# Patient Record
Sex: MALE | Race: WHITE | NOT HISPANIC OR LATINO | URBAN - METROPOLITAN AREA
[De-identification: names, ages, dates, MRNs, and addresses within clinical notes are randomized per-mention and may not be internally consistent; named-entity substitution may affect disease eponyms.]

---

## 2023-03-14 DIAGNOSIS — M54.16 LUMBAR RADICULOPATHY: Primary | ICD-10-CM

## 2023-03-17 ENCOUNTER — TELEPHONE (OUTPATIENT)
Dept: NEUROSURGERY | Facility: CLINIC | Age: 38
End: 2023-03-17
Payer: COMMERCIAL

## 2023-03-17 NOTE — TELEPHONE ENCOUNTER
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  He has LBP and pain into left hip per note.  The pain is rated at 4/10 in the note.  There is a HNP left L4-5.  It does not document strength.  Would you want to pick a date to see him or next available, but put on wait list?

## 2023-03-17 NOTE — TELEPHONE ENCOUNTER
"----- Message from Nadege Caruso MA sent at 3/17/2023 12:18 PM CDT -----  Regarding: REFERRAL PROCESS- lumbar  This patient is being referred to Dr. Mehta by Shana Apodaca for lumbar radiculopathy. Reviewing MRI report, " L3-4 diffuse bulge is most prominent centrally and towards the right with underlying annular tear flattening ventral thecal sac with AP dimension measuring about 8-9mm in the midline." Please review imaging and advise on scheduling. Thank you    "

## 2023-03-20 NOTE — TELEPHONE ENCOUNTER
Spoke with patient. Scheduled him with Dr. Mehta for 3/30/23 at 1:30. Patient has not had any recent testing since MRI in 3/2023. Denies seeing any other doctors for this. Patient has had back sx with Dr. Romero and has not had any conservative treatment for his back. I did advise him that I will need OP notes prior to apt so I will e-mail him the release form and he will e-mail it back.

## 2023-03-20 NOTE — TELEPHONE ENCOUNTER
Tried to call patient to see if he can come in and see Dr. Mehta for 3/30/23 at 1:30, no answer and was unable to leave a vm.

## 2023-05-19 ENCOUNTER — TELEPHONE (OUTPATIENT)
Dept: NEUROSURGERY | Facility: CLINIC | Age: 38
End: 2023-05-19

## 2023-05-19 NOTE — TELEPHONE ENCOUNTER
Patient called to inquire about his apt today. He states that when he got to work this morning, he found out that someone else was leaving around lunch time and he really does not want to be pressed for time for this apt so he requested to r/s. I r/s his apt to 7/11/23 at 1:00. I did put him on the wait list per his request.

## 2023-07-11 ENCOUNTER — OFFICE VISIT (OUTPATIENT)
Dept: NEUROSURGERY | Facility: CLINIC | Age: 38
End: 2023-07-11
Payer: COMMERCIAL

## 2023-07-11 VITALS
RESPIRATION RATE: 16 BRPM | BODY MASS INDEX: 25.06 KG/M2 | WEIGHT: 185 LBS | HEART RATE: 75 BPM | HEIGHT: 72 IN | SYSTOLIC BLOOD PRESSURE: 116 MMHG | DIASTOLIC BLOOD PRESSURE: 73 MMHG

## 2023-07-11 DIAGNOSIS — M54.41 CHRONIC BILATERAL LOW BACK PAIN WITH BILATERAL SCIATICA: ICD-10-CM

## 2023-07-11 DIAGNOSIS — M51.16 LUMBAR DISC DISEASE WITH RADICULOPATHY: Primary | ICD-10-CM

## 2023-07-11 DIAGNOSIS — G89.29 CHRONIC BILATERAL LOW BACK PAIN WITH BILATERAL SCIATICA: ICD-10-CM

## 2023-07-11 DIAGNOSIS — M54.42 CHRONIC BILATERAL LOW BACK PAIN WITH BILATERAL SCIATICA: ICD-10-CM

## 2023-07-11 PROCEDURE — 3074F PR MOST RECENT SYSTOLIC BLOOD PRESSURE < 130 MM HG: ICD-10-PCS | Mod: CPTII,,, | Performed by: NEUROLOGICAL SURGERY

## 2023-07-11 PROCEDURE — 99204 OFFICE O/P NEW MOD 45 MIN: CPT | Mod: ,,, | Performed by: NEUROLOGICAL SURGERY

## 2023-07-11 PROCEDURE — 3078F PR MOST RECENT DIASTOLIC BLOOD PRESSURE < 80 MM HG: ICD-10-PCS | Mod: CPTII,,, | Performed by: NEUROLOGICAL SURGERY

## 2023-07-11 PROCEDURE — 3008F BODY MASS INDEX DOCD: CPT | Mod: CPTII,,, | Performed by: NEUROLOGICAL SURGERY

## 2023-07-11 PROCEDURE — 1159F PR MEDICATION LIST DOCUMENTED IN MEDICAL RECORD: ICD-10-PCS | Mod: CPTII,,, | Performed by: NEUROLOGICAL SURGERY

## 2023-07-11 PROCEDURE — 1159F MED LIST DOCD IN RCRD: CPT | Mod: CPTII,,, | Performed by: NEUROLOGICAL SURGERY

## 2023-07-11 PROCEDURE — 1160F RVW MEDS BY RX/DR IN RCRD: CPT | Mod: CPTII,,, | Performed by: NEUROLOGICAL SURGERY

## 2023-07-11 PROCEDURE — 99204 PR OFFICE/OUTPT VISIT, NEW, LEVL IV, 45-59 MIN: ICD-10-PCS | Mod: ,,, | Performed by: NEUROLOGICAL SURGERY

## 2023-07-11 PROCEDURE — 3074F SYST BP LT 130 MM HG: CPT | Mod: CPTII,,, | Performed by: NEUROLOGICAL SURGERY

## 2023-07-11 PROCEDURE — 1160F PR REVIEW ALL MEDS BY PRESCRIBER/CLIN PHARMACIST DOCUMENTED: ICD-10-PCS | Mod: CPTII,,, | Performed by: NEUROLOGICAL SURGERY

## 2023-07-11 PROCEDURE — 3008F PR BODY MASS INDEX (BMI) DOCUMENTED: ICD-10-PCS | Mod: CPTII,,, | Performed by: NEUROLOGICAL SURGERY

## 2023-07-11 PROCEDURE — 3078F DIAST BP <80 MM HG: CPT | Mod: CPTII,,, | Performed by: NEUROLOGICAL SURGERY

## 2023-07-11 NOTE — PATIENT INSTRUCTIONS
?is a 37 y.o.?male?who has a past medical history significant for anxiety. ?The patient is s/p a left L4-5 diskectomy on 03/01/2007 by . ?He presents as a?new?patient in the neurosurgery clinic for?progressively worsening low back?and bilateral posterior leg pain for the last 6 months with severe pain for the last 2 weeks after standing up from a recliner.  Mr. Romero has a normal motor and sensory neurological exam.    I reviewed pertinent imaging studies with the patient and his significant other.  A MRI lumbar spine without gadolinium demonstrates normal alignment with postoperative changes s/p a left L4-5 diskectomy.  L4-5 has degenerative disc disease with Modic changes.  At L3-4, there is a disc bulge with mild stenosis and bilateral neuroforaminal narrowing.  Left L4-5 disc herniation with bilateral neuroforaminal narrowing.  At L5-S1, there is right-greater-than-left neuroforaminal narrowing.         PLAN:    Encounter Diagnoses   Name Primary?    Lumbar disc disease with radiculopathy Yes    Chronic bilateral low back pain with bilateral sciatica      Orders Placed This Encounter   Procedures    X-Ray Lumbar Complete Including Flex And Ext    Ambulatory referral/consult to Physical/Occupational Therapy    Ambulatory referral/consult to Pain Clinic          1.  I discussed with Mr. Romero and his significant other that his symptoms of low back pain and radiating bilateral lower extremity pain are related to the degenerative changes in his lumbar spine.  Continued optimization of medical management is recommended, but if his symptoms do not improve, he may be a future candidate for a redo lumbar surgery, with details to be determined.      2.  I am ordering lumbar spine x-rays with AP, lateral, and flexion-extension views. The patient will be contacted with these radiographic results and any updates to the plan of care.     3.  Mr. Romero is being referred to physical therapy at [facility]  in [place], [place].    4.  In addition, he is being referred to pain management specialist Dr. Padilla for evaluation and consideration of a lumbar epidural steroid injection.    5.  We discussed smoking cessation goals and how nicotine inhibits tissue healing.    6.  Arrangements are being made for the patient to follow up in my neurosurgery clinic in 6 weeks.       This note will be sent to the patient's referring provider Paper Order and primary care provider Maryjo Barton MD.

## 2023-07-11 NOTE — PROGRESS NOTES
[facility]   Neurosurgery      Matthew Romero  MRN: [id number], CSN: [id number]      : 1985   Age: 37 y.o. male  Payor: [facility] BLUE SHIELD / Plan: [employer] [place] PPO / Product Type: PPO /       Ref:  Paper Order  No address on file    PCP: Maryjo Barton MD    Visit Date: 2023     There is no problem list on file for this patient.      SUBJECTIVE:      CC:   Chief Complaint   Patient presents with    low back pain with radiation down b legs       HPI:   ?is a 37 y.o.?male?who has a past medical history significant for anxiety. ?The patient is s/p a left L4-5 diskectomy on 2007 by . ?He presents as a?new?patient in the neurosurgery clinic for?progressively worsening low back?and bilateral posterior leg pain for the last 6 months with severe pain for the last 2 weeks after standing up from a recliner.     The patient visits the neurosurgery clinic with his significant other.  He prefers to stand during his evaluation because sitting causes significant pain in his bilateral anterior thighs.  Mr. Romero has had intermittent low back pain since his left L4-5 diskectomy in .  However, for the last 6 months, the patient has been experiencing gradual onset of pain across his lower back.   Then, 2 weeks ago after standing up from a recliner, he had onset of severe acute on chronic low back pain with radiation into his bilateral posterior legs into his feet.  His significant other shows me a picture of his back where he was unable to straighten up vertically secondary to back pain.  The patient reports that the soles of his bilateral feet hurt without any bowel or bladder incontinence.    There is increased pain with bending and lifting activities. There is a reduction of pain when lying down.  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 He has had to modify his work related duties in the last 2  weeks due to his high pain level, which remains at least a 7/10.  The patient has tried taking Tylenol, Motrin, Aleve, and various muscle relaxants.  He has applied ice to his back.  Mr. Romero has completed chiropractic treatments, but has not participated in physical therapy and is not established with a pain management specialist.  It is noted that the patient completed lumbar epidural steroid injections prior to his left L4-5 diskectomy surgery in 2007.      There are no problems to display for this patient.    Past Medical History:   Diagnosis Date    Anxiety     Cervicalgia     Tobacco dependence      Past Surgical History:   Procedure Laterality Date    BACK SURGERY      microdiscectomy L4-5    HERNIA REPAIR         Current Outpatient Medications:     cetirizine (ZYRTEC) 10 MG tablet, Take 1 tablet by mouth every morning., Disp: , Rfl:     azelastine (ASTELIN) 137 mcg (0.1 %) nasal spray, 1 spray 2 (two) times daily., Disp: , Rfl:     famotidine (PEPCID) 40 MG tablet, Take 40 mg by mouth every evening., Disp: , Rfl:     nystatin (MYCOSTATIN) 100,000 unit/mL suspension, Take 5 mLs by mouth 4 (four) times daily., Disp: , Rfl:     omeprazole (PRILOSEC) 40 MG capsule, Take 40 mg by mouth every morning., Disp: , Rfl:     sertraline (ZOLOFT) 50 MG tablet, Take 50 mg by mouth every morning., Disp: , Rfl:     Review of patient's allergies indicates:  No Known Allergies    Social History     Tobacco Use    Smoking status: Every Day     Packs/day: 1.50     Years: 15.00     Pack years: 22.50     Types: Cigarettes    Smokeless tobacco: Never   Substance Use Topics    Alcohol use: Not on file     Occupation: sells [employer]    Family History   Problem Relation Age of Onset    No Known Problems Mother     No Known Problems Father     Diabetes Maternal Grandmother     Kidney disease Maternal Grandfather     Kidney disease Paternal Grandfather        ROS:  Constitutional:  Negative for chills and fever.   HENT:   Negative for congestion and sore throat.    Eyes:  Negative for blurred vision and double vision.   Respiratory:  Negative for cough and shortness of breath.    Cardiovascular:  Negative for chest pain and palpitations.   Gastrointestinal:  Negative for constipation, diarrhea, nausea and vomiting.   Musculoskeletal:  Positive for back pain, negative for neck pain.   Neurological:  Negative for sensory change, focal weakness and headaches.   Endo/Heme/Allergies:  Does not bruise/bleed easily.   Psychiatric/Behavioral:  Positive for anxiety, Negative for depression.       OBJECTIVE:     EXAMINATION:  /73 (BP Location: Left arm, Patient Position: Sitting)   Pulse 75   Resp 16   Ht 6' (1.829 m)   Wt 83.9 kg (185 lb)   BMI 25.09 kg/m²   Body Habitus: Normal    Physical Exam:  Constitutional: The patient is well-developed and cooperative, sitting comfortably on an exam table    Mental Status:   Oriented to person, place, and time  Normal speech    Cranial nerves:  CN II: PERRL, 4 to 3 mm, brisk bilaterally  CN III, IV, and VI: extraocular movements normal, no ptosis  CN V: normal facial sensation and masseter function  CN VII: facial strength normal and symmetrical  CN VIII: hearing normal bilaterally  CN IX and X: swallowing and phonation normal  CN XI: shoulder shrug intact bilaterally  CN XII: tongue protrusion midline    Motor:  Muscle bulk: normal in all extremities  Tone: normal in all extremities    Upper extremities:  Deltoid: right 5/5; left 5/5  Biceps: right 5/5; left 5/5  Triceps: right 5/5; left 5/5  : right 5/5; left 5/5  Interosseous muscles: right 5/5; left 5/5    Lower extremities:  Hip flexors: right 5/5; left 5/5  Knee extensors: right 5/5; left 5/5  Knee flexors: right 5/5; left 5/5  Foot dorsiflexors: right 5/5; left 5/5  Foot plantar flexors: right 5/5; left 5/5  Extensor hallucis longus: right 5/5; left 5/5    Sensation:  Normal to light touch x 4 extremities    Reflexes:  Biceps:  right 1+/4; left 1+/4  Brachioradialis: right 1+/4; left 1+/4  Triceps: right 1+/4; left 1+/4  Knee: right 1+/4; left 1+/4  Ankle: right 0/4; left 0/4    Dass sign: right negative; left negative  Babinski: right downgoing; left downgoing  Clonus: right negative; left negative    Coordination:  Finger to nose: right normal; left normal    Musculoskeletal:    Gait:  Toe walk- normal  Heel walk- normal  Tandem gait- normal    Straight leg test: right negative; left negative    Cervical: No pain with palpation  Upper back: No pain with palpation  Lower back: No pain with palpation, well healed midline scar      DIAGNOSTICS REVIEW OF IMAGING, LAB & OTHER STUDIES:  I have personally reviewed and evaluated the following reports as well as radiographic studies:    MRI lumbar spine without gadolinium, 03/14/2023- there is normal alignment with postoperative changes s/p a left L4-5 diskectomy.  L4-5 has degenerative disc disease with Modic changes. At L3-4, there is a disc bulge with mild stenosis and bilateral neuroforaminal narrowing.  Left L4-5 disc herniation with bilateral neuroforaminal narrowing.  At L5-S1, there is right-greater-than-left neuroforaminal narrowing.        ASSESSMENT:  ?is a 37 y.o.?male?who has a past medical history significant for anxiety. ?The patient is s/p a left L4-5 diskectomy on 03/01/2007 by . ?He presents as a?new?patient in the neurosurgery clinic for?progressively worsening low back?and bilateral posterior leg pain for the last 6 months with severe pain for the last 2 weeks after standing up from a recliner.  Mr. Romero has a normal motor and sensory neurological exam.    I reviewed pertinent imaging studies with the patient and his significant other.  A MRI lumbar spine without gadolinium demonstrates normal alignment with postoperative changes s/p a left L4-5 diskectomy.  L4-5 has degenerative disc disease with Modic changes.  At L3-4, there is a disc bulge  with mild stenosis and bilateral neuroforaminal narrowing.  Left L4-5 disc herniation with bilateral neuroforaminal narrowing.  At L5-S1, there is right-greater-than-left neuroforaminal narrowing.         PLAN:    Encounter Diagnoses   Name Primary?    Lumbar disc disease with radiculopathy Yes    Chronic bilateral low back pain with bilateral sciatica      Orders Placed This Encounter   Procedures    X-Ray Lumbar Complete Including Flex And Ext    Ambulatory referral/consult to Physical/Occupational Therapy    Ambulatory referral/consult to Pain Clinic          1.  I discussed with Mr. Romero and his significant other that his symptoms of low back pain and radiating bilateral lower extremity pain are related to the degenerative changes in his lumbar spine.  Continued optimization of medical management is recommended, but if his symptoms do not improve, he may be a future candidate for a redo lumbar surgery, with details to be determined.      2.  I am ordering lumbar spine x-rays with AP, lateral, and flexion-extension views. The patient will be contacted with these radiographic results and any updates to the plan of care.     3.  Mr. Romero is being referred to physical therapy at [facility] in [place], [place].    4.  In addition, he is being referred to pain management specialist Dr. Padilla for evaluation and consideration of a lumbar epidural steroid injection.    5.  We discussed smoking cessation goals and how nicotine inhibits tissue healing.    6.  Arrangements are being made for the patient to follow up in my neurosurgery clinic in 6 weeks.       This note will be sent to the patient's referring provider Paper Order and primary care provider Maryjo Barton MD.           Mirna Mehta MD  Neurosurgeon

## 2023-08-15 ENCOUNTER — OFFICE VISIT (OUTPATIENT)
Dept: PAIN MEDICINE | Facility: CLINIC | Age: 38
End: 2023-08-15
Payer: COMMERCIAL

## 2023-08-15 VITALS
WEIGHT: 180 LBS | HEART RATE: 75 BPM | DIASTOLIC BLOOD PRESSURE: 77 MMHG | TEMPERATURE: 98 F | HEIGHT: 72 IN | BODY MASS INDEX: 24.38 KG/M2 | SYSTOLIC BLOOD PRESSURE: 118 MMHG

## 2023-08-15 DIAGNOSIS — G89.29 CHRONIC BILATERAL LOW BACK PAIN WITH BILATERAL SCIATICA: ICD-10-CM

## 2023-08-15 DIAGNOSIS — M54.42 CHRONIC BILATERAL LOW BACK PAIN WITH BILATERAL SCIATICA: ICD-10-CM

## 2023-08-15 DIAGNOSIS — M54.41 CHRONIC BILATERAL LOW BACK PAIN WITH BILATERAL SCIATICA: ICD-10-CM

## 2023-08-15 DIAGNOSIS — M51.16 LUMBAR DISC DISEASE WITH RADICULOPATHY: ICD-10-CM

## 2023-08-15 PROCEDURE — 3078F PR MOST RECENT DIASTOLIC BLOOD PRESSURE < 80 MM HG: ICD-10-PCS | Mod: CPTII,,, | Performed by: ANESTHESIOLOGY

## 2023-08-15 PROCEDURE — 3074F PR MOST RECENT SYSTOLIC BLOOD PRESSURE < 130 MM HG: ICD-10-PCS | Mod: CPTII,,, | Performed by: ANESTHESIOLOGY

## 2023-08-15 PROCEDURE — 3074F SYST BP LT 130 MM HG: CPT | Mod: CPTII,,, | Performed by: ANESTHESIOLOGY

## 2023-08-15 PROCEDURE — 1159F PR MEDICATION LIST DOCUMENTED IN MEDICAL RECORD: ICD-10-PCS | Mod: CPTII,,, | Performed by: ANESTHESIOLOGY

## 2023-08-15 PROCEDURE — 1160F RVW MEDS BY RX/DR IN RCRD: CPT | Mod: CPTII,,, | Performed by: ANESTHESIOLOGY

## 2023-08-15 PROCEDURE — 3008F BODY MASS INDEX DOCD: CPT | Mod: CPTII,,, | Performed by: ANESTHESIOLOGY

## 2023-08-15 PROCEDURE — 99204 PR OFFICE/OUTPT VISIT, NEW, LEVL IV, 45-59 MIN: ICD-10-PCS | Mod: ,,, | Performed by: ANESTHESIOLOGY

## 2023-08-15 PROCEDURE — 99204 OFFICE O/P NEW MOD 45 MIN: CPT | Mod: ,,, | Performed by: ANESTHESIOLOGY

## 2023-08-15 PROCEDURE — 3008F PR BODY MASS INDEX (BMI) DOCUMENTED: ICD-10-PCS | Mod: CPTII,,, | Performed by: ANESTHESIOLOGY

## 2023-08-15 PROCEDURE — 1159F MED LIST DOCD IN RCRD: CPT | Mod: CPTII,,, | Performed by: ANESTHESIOLOGY

## 2023-08-15 PROCEDURE — 1160F PR REVIEW ALL MEDS BY PRESCRIBER/CLIN PHARMACIST DOCUMENTED: ICD-10-PCS | Mod: CPTII,,, | Performed by: ANESTHESIOLOGY

## 2023-08-15 PROCEDURE — 3078F DIAST BP <80 MM HG: CPT | Mod: CPTII,,, | Performed by: ANESTHESIOLOGY

## 2023-08-15 NOTE — PROGRESS NOTES
Allyson Padilla MD        PATIENT NAME: Matthew Romero  : 1985  DATE: 8/15/23  MRN: [id number]      Billing Provider: Allyson Padilla MD  Level of Service:   Patient PCP Information       Provider PCP Type    Maryjo Barton MD General            Reason for Visit / Chief Complaint: Back Pain (Back pain referral from Dr Olsen.  Wants to talk about injections in the back. Does not take anything for the pain, OTC don't help. Home exercises which does not really help.  MRI in chart. Pain level is 6/10. Prior back surgery )       Update PCP  Update Chief Complaint         History of Present Illness / Problem Focused Workflow     Matthew Romero presents to the clinic with Back Pain (Back pain referral from Dr Olsen.  Wants to talk about injections in the back. Does not take anything for the pain, OTC don't help. Home exercises which does not really help.  MRI in chart. Pain level is 6/10. Prior back surgery )     This is a 37-year-old male who presents to clinic today for his initial consultation as a referral from Dr. Mehta.  He complains of low back pain that has been present for many years.  He underwent lumbar spine surgery in  with Dr. Underwood; at that time, he was having back pain radiating down to his feet and states that his legs would give out on him and caused him to fall.  Since then, he has gone to the chiropractor periodically.  However, earlier this year his pain started to worsen and become more consistent.  He has pain radiating from his back into his right lower extremity down to the knee.  Dr. Mehta referred him for physical therapy, and he has been doing some stretches and exercises at home.  Currently rates his pain as 6/10 on the NRS.  His pain is better, almost none, when he is lying flat on his back.  He tries to be careful about what he is doing to not aggravate his back pain.      Back Pain  Associated symptoms include leg pain.     Review of Systems     Review of Systems    Musculoskeletal:  Positive for back pain and leg pain.   All other systems reviewed and are negative.       Medical / Social / Family History     Past Medical History:   Diagnosis Date    Anxiety     Cervicalgia     Tobacco dependence        Past Surgical History:   Procedure Laterality Date    BACK SURGERY  2007    microdiscectomy L4-5    HERNIA REPAIR         Social History  Mr. Romero  reports that he has been smoking cigarettes. He has a 22.5 pack-year smoking history. He has never used smokeless tobacco. He reports that he does not drink alcohol and does not use drugs.    Family History  'matthew Romero family history includes Diabetes in his maternal grandmother; Kidney disease in his maternal grandfather and paternal grandfather; No Known Problems in his father and mother.    Medications and Allergies     Medications  Outpatient Medications Marked as Taking for the 8/15/23 encounter (Office Visit) with Allyson Padilla MD   Medication Sig Dispense Refill    cetirizine (ZYRTEC) 10 MG tablet Take 1 tablet by mouth every morning.         Allergies  Review of patient's allergies indicates:  No Known Allergies    Physical Examination     Vitals:    08/15/23 1019   BP: 118/77   Pulse: 75   Temp: 98 °F (36.7 °C)     Pain Disability Index (PDI): 32       Spine Musculoskeletal Exam    Gait    Gait is normal.    Inspection    Thoracolumbar    Thoracolumbar inspection is normal.    Palpation    Thoracolumbar    Right      Masses: none      Spasms: none      Crepitus: none      Muscle tone: normal    Left      Masses: none      Spasms: none      Crepitus: none      Muscle tone: normal    Strength    Thoracolumbar    Thoracolumbar motor exam is normal.       Sensory    Thoracolumbar    Thoracolumbar sensation is normal.    General      Constitutional: appears stated age, well-developed and well-nourished    Scleral icterus: no    Labored breathing: no    Psychiatric: normal mood and affect and no acute distress     Neurological: alert and oriented x3    Skin: intact    Lymphadenopathy: none  CV: extremities warm, well-perfused  Resp: nonlabored breathing       Assessment and Plan (including Health Maintenance)      Problem List  Smart Sets  Document Outside HM   :    Plan:   Chronic bilateral low back pain with bilateral sciatica  -     Ambulatory referral/consult to Pain Clinic    Lumbar disc disease with radiculopathy  -     Ambulatory referral/consult to Pain Clinic       I am scheduling the patient for right L4 and L5 transforaminal epidural steroid injections for his low back pain radiating down the right lower extremity, consistent with findings of degenerative disc disease and foraminal stenosis seen on his MRI.  The plan was discussed with the patient and he wishes to proceed.    Problem List Items Addressed This Visit       Chronic bilateral low back pain with bilateral sciatica    Lumbar disc disease with radiculopathy         Future Appointments   Date Time Provider Department Center   8/29/2023  9:00 AM Mirna Mehta MD [facility]        There are no Patient Instructions on file for this visit.  No follow-ups on file.     Signature:  Allyson Padilla MD      Date of encounter: 8/15/23

## 2023-08-28 ENCOUNTER — TELEPHONE (OUTPATIENT)
Dept: NEUROSURGERY | Facility: CLINIC | Age: 38
End: 2023-08-28
Payer: COMMERCIAL

## 2023-08-30 ENCOUNTER — TELEPHONE (OUTPATIENT)
Dept: NEUROSURGERY | Facility: CLINIC | Age: 38
End: 2023-08-30
Payer: COMMERCIAL

## 2023-08-30 NOTE — TELEPHONE ENCOUNTER
I am requesting Elsi to contact Mr. Romero.  I reviewed the patient's lumbar spine x-ray images that were completed on 07/24/2023.    These lumbar spine x-rays demonstrate normal alignment with no motion on flexion-extension views.        With this updated radiographic result, my recommendations are as follows:    1.  I would like to confirm that Mr. Romero has initiated physical therapy.      2.  The patient is scheduled for a lumbar epidural steroid injection by Dr. Padilla on 09/13/2023 with a follow up visit on 09/27/2023.      3.  He is encouraged to be nicotine free.      4.  I would like arrangements made for Mr. Romero to follow up in my neurosurgery clinic for re-evaluation in early September 2023 on a date at least 2 weeks after his lumbar epidural steroid injection.

## 2023-09-06 ENCOUNTER — TELEPHONE (OUTPATIENT)
Dept: PAIN MEDICINE | Facility: CLINIC | Age: 38
End: 2023-09-06
Payer: COMMERCIAL

## 2023-09-11 NOTE — TELEPHONE ENCOUNTER
I would like Elsi to try to contact Mr. Romero again.     In reviewing Mr. Romero's medical records, he canceled his lumbar epidural steroid injection with Dr. Padilla due to a family emergency.    I would like arrangements made for Mr. Romero to follow up in my neurosurgery clinic for re-evaluation at least 2 weeks after his rescheduled lumbar epidural steroid injection.    If the patient is not able to be reached by phone, I would like a letter sent to the patient with a copy of this entire progress note and request for him to contact the neurosurgery office to schedule his follow up visit after his procedure by Dr. Padilla.

## 2024-01-30 ENCOUNTER — OFFICE VISIT (OUTPATIENT)
Dept: PAIN MEDICINE | Facility: CLINIC | Age: 39
End: 2024-01-30
Payer: COMMERCIAL

## 2024-01-30 VITALS
HEIGHT: 72 IN | TEMPERATURE: 99 F | DIASTOLIC BLOOD PRESSURE: 80 MMHG | HEART RATE: 93 BPM | WEIGHT: 180 LBS | BODY MASS INDEX: 24.38 KG/M2 | SYSTOLIC BLOOD PRESSURE: 120 MMHG

## 2024-01-30 DIAGNOSIS — M54.42 CHRONIC BILATERAL LOW BACK PAIN WITH BILATERAL SCIATICA: ICD-10-CM

## 2024-01-30 DIAGNOSIS — G89.29 CHRONIC BILATERAL LOW BACK PAIN WITH BILATERAL SCIATICA: ICD-10-CM

## 2024-01-30 DIAGNOSIS — M54.41 CHRONIC BILATERAL LOW BACK PAIN WITH BILATERAL SCIATICA: ICD-10-CM

## 2024-01-30 DIAGNOSIS — M51.16 LUMBAR DISC DISEASE WITH RADICULOPATHY: Primary | ICD-10-CM

## 2024-01-30 PROCEDURE — 3074F SYST BP LT 130 MM HG: CPT | Mod: CPTII,,, | Performed by: ANESTHESIOLOGY

## 2024-01-30 PROCEDURE — 1159F MED LIST DOCD IN RCRD: CPT | Mod: CPTII,,, | Performed by: ANESTHESIOLOGY

## 2024-01-30 PROCEDURE — 99213 OFFICE O/P EST LOW 20 MIN: CPT | Mod: ,,, | Performed by: ANESTHESIOLOGY

## 2024-01-30 PROCEDURE — 3008F BODY MASS INDEX DOCD: CPT | Mod: CPTII,,, | Performed by: ANESTHESIOLOGY

## 2024-01-30 PROCEDURE — 1160F RVW MEDS BY RX/DR IN RCRD: CPT | Mod: CPTII,,, | Performed by: ANESTHESIOLOGY

## 2024-01-30 PROCEDURE — 3079F DIAST BP 80-89 MM HG: CPT | Mod: CPTII,,, | Performed by: ANESTHESIOLOGY

## 2024-01-30 RX ORDER — GABAPENTIN 300 MG/1
300 CAPSULE ORAL NIGHTLY
COMMUNITY
Start: 2024-01-29

## 2024-01-30 NOTE — PROGRESS NOTES
Allyson Padilla MD        PATIENT NAME: Matthew Romero  : 1985  DATE: 24  MRN: [id number]      Billing Provider: Allyson Padilla MD  Level of Service:   Patient PCP Information       Provider PCP Type    Maryjo Barton MD General            Reason for Visit / Chief Complaint: Pain (Est patient here with Low back pain w/ Bilateral sciatica- had TFESI scheduled but cancelled. Seen Neuro Dr and he suggested patient try injection prior to surgery. C/o pain with sitting and radiating down Left leg to heel. )       Update PCP  Update Chief Complaint         History of Present Illness / Problem Focused Workflow     Matthew Romero presents to the clinic with Pain (Est patient here with Low back pain w/ Bilateral sciatica- had TFESI scheduled but cancelled. Seen Neuro  and he suggested patient try injection prior to surgery. C/o pain with sitting and radiating down Left leg to heel. )     This is a 38-year-old male who presents to clinic today for his initial consultation as a referral from Dr. Mehta.  He complains of low back pain that has been present for many years.  He underwent lumbar spine surgery in  with Dr. Underwood; at that time, he was having back pain radiating down to his feet and states that his legs would give out on him and caused him to fall.  Since then, he has gone to the chiropractor periodically.  However, earlier this year his pain started to worsen and become more consistent.  He has pain radiating from his back into his right lower extremity down to the knee.  Dr. Mehta referred him for physical therapy, and he has been doing some stretches and exercises at home.  He then saw Dr. Richardson in [place] for a 2nd opinion and he recommended try an injection before considering surgery.  He is here today to get that scheduled.  He continues to complain of low back pain that radiates down the left lower extremity into the calf.      Back Pain  Associated symptoms include leg pain.    Pain      Review of Systems     Review of Systems   Musculoskeletal:  Positive for back pain and leg pain.   All other systems reviewed and are negative.       Medical / Social / Family History     Past Medical History:   Diagnosis Date    Anxiety     Arthritis     Cervicalgia     Pain     Tobacco dependence        Past Surgical History:   Procedure Laterality Date    BACK SURGERY  2007    microdiscectomy L4-5    HERNIA REPAIR         Social History  Mr. Romero  reports that he has been smoking cigarettes. He has a 22.5 pack-year smoking history. He has never used smokeless tobacco. He reports that he does not drink alcohol and does not use drugs.    Family History  'matthew Romero family history includes Diabetes in his maternal grandmother; Kidney disease in his maternal grandfather and paternal grandfather; No Known Problems in his father and mother.    Medications and Allergies     Medications  Outpatient Medications Marked as Taking for the 1/30/24 encounter (Office Visit) with Allyson Padilla MD   Medication Sig Dispense Refill    cetirizine (ZYRTEC) 10 MG tablet Take 1 tablet by mouth every morning.         Allergies  Review of patient's allergies indicates:  No Known Allergies    Physical Examination     Vitals:    01/30/24 1423   BP: 120/80   Pulse: 93   Temp: 98.5 °F (36.9 °C)     Pain Disability Index (PDI): 47       Spine Musculoskeletal Exam    Gait    Gait is normal.    Inspection    Thoracolumbar    Thoracolumbar inspection is normal.    Palpation    Thoracolumbar    Right      Masses: none      Spasms: none      Crepitus: none      Muscle tone: normal    Left      Masses: none      Spasms: none      Crepitus: none      Muscle tone: normal    Strength    Thoracolumbar    Thoracolumbar motor exam is normal.       Sensory    Thoracolumbar    Thoracolumbar sensation is normal.    General      Constitutional: appears stated age, well-developed and well-nourished    Scleral icterus: no    Labored  breathing: no    Psychiatric: normal mood and affect and no acute distress    Neurological: alert and oriented x3    Skin: intact    Lymphadenopathy: none  CV: extremities warm, well-perfused  Resp: nonlabored breathing       Assessment and Plan (including Health Maintenance)      Problem List  Smart Sets  Document Outside HM   :    Plan:   Lumbar disc disease with radiculopathy    Chronic bilateral low back pain with bilateral sciatica       I am scheduling the patient for left L4 and L5 transforaminal epidural steroid injections for his low back pain radiating down the right lower extremity, consistent with findings of degenerative disc disease and foraminal stenosis seen on his MRI.  The plan was discussed with the patient and he wishes to proceed.    Problem List Items Addressed This Visit       Chronic bilateral low back pain with bilateral sciatica    Lumbar disc disease with radiculopathy - Primary         No future appointments.       There are no Patient Instructions on file for this visit.  No follow-ups on file.     Signature:  Allyson Padilla MD      Date of encounter: 1/30/24

## 2024-01-31 DIAGNOSIS — M54.16 LUMBAR RADICULOPATHY: ICD-10-CM

## 2024-01-31 DIAGNOSIS — M47.816 LUMBAR SPONDYLOSIS: ICD-10-CM

## 2024-01-31 DIAGNOSIS — M51.36 DDD (DEGENERATIVE DISC DISEASE), LUMBAR: Primary | ICD-10-CM

## 2024-02-26 NOTE — PROGRESS NOTES
ADMISSION HISTORY & PHYSICAL    SUBJECTIVE:    CHIEF COMPLAINT: Back Pain (Pre-op Left TFESI L4&L5 3/07/2024, RX medication for relief, pian level 6/10 )       History of Present Illness: 38 y.o. male presents today for preoperative evaluation for transforaminal epidural steroid injection left L4 +L5 on 03/07/2024. I reviewed the indications for procedure. The risks and benefits of the proposed and alternative treatments were discussed with the patient. Questions pertinent to the procedure were solicited and answered. No assurances were given. Informed consent was obtained. The patient expressed good understanding and wished to proceed with scheduling the procedure.     Review of Systems:   Constitutional: No fever, weakness, or fatigue.   Ear/Nose/Mouth/Throat: No nasal congestion or sore throat.   Respiratory: No shortness of breath or cough.   Cardiovascular: No chest pain, palpitations, or peripheral edema.   Gastrointestinal: No nausea, vomiting, or abdominal pain.   Genitourinary: No dysuria.  Musculoskeletal: He complains of low back pain that has been present for many years. He underwent lumbar spine surgery in 2007 with Dr. Underwood; at that time, he was having back pain radiating down to his feet and states that his legs would give out on him and caused him to fall. Since then, he has gone to the chiropractor periodically. However, earlier this year his pain started to worsen and become more consistent. He has pain radiating from his back into his left lower extremity down to the knee. Dr. Mehta referred him for physical therapy, and he has been doing some stretches and exercises at home. He then saw Dr. Richardson in [place] for a 2nd opinion and he recommended try an injection before considering surgery. He is here today to get that scheduled. He continues to complain of low back pain that radiates down the left lower extremity into the calf.     Past Surgical History:   Procedure Laterality Date     BACK SURGERY  2007    microdiscectomy L4-5    HERNIA REPAIR          Past Medical History:   Diagnosis Date    Anxiety     Arthritis     Cervicalgia     Pain     Tobacco dependence         OBJECTIVE:    Vitals:    02/27/24 0953   BP: 119/69   Pulse: 90   Temp: 98.6 °F (37 °C)      Pain Disability Index  Family/Home Responsibilities:: 5  Recreation:: 6  Social Activity:: 5  Occupation:: 6  Sexual Behavior:: 6  Self Care:: 4  Life-Support Activities:: 6  Pain Disability Index (PDI): 38      Physical Exam:   General: Well-developed, well-nourished.  Neuro: Alert and oriented x 3.  Psych: Normal mood and affect.  HEENT: Normocephalic. PERRLA EOM intact. Nose and throat clear.  Lungs: Clear to auscultation and percussion.  Heart: Regular rate and rhythm   Abdomen: Soft non-tender. Bowel sounds positive. No rebound tenderness.  Skin: No rashes or open wounds  Musculoskeletal:  Normal gait, left sciatica.  Independent ambulator.  Bilateral upper and lower motor strength 5/5.  Negative sensory deprivation to bilateral upper or lower extremities.  Equal handgrip bilaterally.    ASSESSMENT:  1. DDD (degenerative disc disease), lumbar    2. Lumbar spondylosis       PLAN:  Plan for to proceed with transforaminal epidural steroid injection left L4 +L5 on 03/07/2024. . The patient has been given preoperative instructions and prescriptions for post-operative medication. Post-operative appointment is scheduled for 2 weeks.  Relayed to patient to hold all nonsteroidal anti-inflammatory medications including but not limited to ibuprofen, Aleve, Anaprox, naproxen as well as omega-3 and fish oil for 7 days prior to procedure and then he can resume postop.  Patient verified understanding.

## 2024-02-27 ENCOUNTER — OFFICE VISIT (OUTPATIENT)
Dept: PAIN MEDICINE | Facility: CLINIC | Age: 39
End: 2024-02-27
Payer: COMMERCIAL

## 2024-02-27 VITALS
HEART RATE: 90 BPM | BODY MASS INDEX: 25.06 KG/M2 | WEIGHT: 185 LBS | SYSTOLIC BLOOD PRESSURE: 119 MMHG | DIASTOLIC BLOOD PRESSURE: 69 MMHG | TEMPERATURE: 99 F | HEIGHT: 72 IN

## 2024-02-27 DIAGNOSIS — M51.36 DDD (DEGENERATIVE DISC DISEASE), LUMBAR: Primary | ICD-10-CM

## 2024-02-27 DIAGNOSIS — M47.816 LUMBAR SPONDYLOSIS: ICD-10-CM

## 2024-02-27 PROCEDURE — 3074F SYST BP LT 130 MM HG: CPT | Mod: CPTII,,, | Performed by: NURSE PRACTITIONER

## 2024-02-27 PROCEDURE — 3008F BODY MASS INDEX DOCD: CPT | Mod: CPTII,,, | Performed by: NURSE PRACTITIONER

## 2024-02-27 PROCEDURE — 1160F RVW MEDS BY RX/DR IN RCRD: CPT | Mod: CPTII,,, | Performed by: NURSE PRACTITIONER

## 2024-02-27 PROCEDURE — 3078F DIAST BP <80 MM HG: CPT | Mod: CPTII,,, | Performed by: NURSE PRACTITIONER

## 2024-02-27 PROCEDURE — 99213 OFFICE O/P EST LOW 20 MIN: CPT | Mod: ,,, | Performed by: NURSE PRACTITIONER

## 2024-02-27 PROCEDURE — 1159F MED LIST DOCD IN RCRD: CPT | Mod: CPTII,,, | Performed by: NURSE PRACTITIONER

## 2024-04-10 ENCOUNTER — TELEPHONE (OUTPATIENT)
Dept: PAIN MEDICINE | Facility: CLINIC | Age: 39
End: 2024-04-10
Payer: COMMERCIAL

## 2024-04-10 NOTE — TELEPHONE ENCOUNTER
----- Message from Pao Melendez sent at 4/10/2024  1:32 PM CDT -----  Regarding: FW: Letter of Release    ----- Message -----  From: Pao Melendez  Sent: 3/25/2024   3:41 PM CDT  To: [facility] Staff  Subject: Letter of Release                                 phoned the office today because he needs a letter of release from the practice because he was scheduled for a procedure last month and requested that he be put at an early time because of his diabetes and he gets panicky because he can't eat.  Per him he was told by the hospital that we could not accommodate his request so he cancelled the procedure with the hospital.  They tried calling the office but could not get any answer because they were calling the old office number.  He is trying to see another pain management doctor but they need a letter of release from this office stating we are no longer treating him.  Please fax it to [phone].

## 2024-04-11 NOTE — TELEPHONE ENCOUNTER
To whom it may concern:    Mr. Romero has been released from my care.  Please contact me if you have any other questions.      Allyson Padilla MD

## 2024-06-28 ENCOUNTER — HOSPITAL ENCOUNTER (EMERGENCY)
Facility: HOSPITAL | Age: 39
Discharge: HOME | End: 2024-06-28
Attending: EMERGENCY MEDICINE
Payer: COMMERCIAL

## 2024-06-28 VITALS
RESPIRATION RATE: 18 BRPM | BODY MASS INDEX: 24.38 KG/M2 | TEMPERATURE: 98 F | WEIGHT: 180 LBS | HEIGHT: 72 IN | OXYGEN SATURATION: 95 % | HEART RATE: 60 BPM | SYSTOLIC BLOOD PRESSURE: 130 MMHG | DIASTOLIC BLOOD PRESSURE: 80 MMHG

## 2024-06-28 DIAGNOSIS — M54.42 ACUTE LEFT-SIDED LOW BACK PAIN WITH LEFT-SIDED SCIATICA: Primary | ICD-10-CM

## 2024-06-28 LAB
BACTERIA #/AREA URNS AUTO: ABNORMAL /HPF
BILIRUB UR QL STRIP.AUTO: NEGATIVE
CAOX CRY UR QL COMP ASSIST: ABNORMAL
CLARITY UR: CLEAR
COLOR UR AUTO: YELLOW
GLUCOSE UR QL STRIP: NORMAL
HGB UR QL STRIP: NEGATIVE
KETONES UR QL STRIP: ABNORMAL
LEUKOCYTE ESTERASE UR QL STRIP: NEGATIVE
MUCOUS THREADS URNS QL MICRO: ABNORMAL /LPF
NITRITE UR QL STRIP: NEGATIVE
PH UR STRIP: 5.5 [PH]
PROT UR QL STRIP: NEGATIVE
RBC #/AREA URNS AUTO: ABNORMAL /HPF
SP GR UR STRIP.AUTO: 1.03 (ref 1–1.03)
SQUAMOUS #/AREA URNS LPF: ABNORMAL /HPF
UROBILINOGEN UR STRIP-ACNC: 2
WBC #/AREA URNS AUTO: ABNORMAL /HPF

## 2024-06-28 PROCEDURE — 96375 TX/PRO/DX INJ NEW DRUG ADDON: CPT

## 2024-06-28 PROCEDURE — 63600175 PHARM REV CODE 636 W HCPCS: Performed by: NURSE PRACTITIONER

## 2024-06-28 PROCEDURE — 99285 EMERGENCY DEPT VISIT HI MDM: CPT | Mod: 25

## 2024-06-28 PROCEDURE — 96376 TX/PRO/DX INJ SAME DRUG ADON: CPT

## 2024-06-28 PROCEDURE — 96374 THER/PROPH/DIAG INJ IV PUSH: CPT

## 2024-06-28 PROCEDURE — 96361 HYDRATE IV INFUSION ADD-ON: CPT

## 2024-06-28 PROCEDURE — 25000003 PHARM REV CODE 250: Performed by: NURSE PRACTITIONER

## 2024-06-28 PROCEDURE — 81001 URINALYSIS AUTO W/SCOPE: CPT | Performed by: NURSE PRACTITIONER

## 2024-06-28 RX ORDER — DIAZEPAM 5 MG/1
10 TABLET ORAL
Status: COMPLETED | OUTPATIENT
Start: 2024-06-28 | End: 2024-06-28

## 2024-06-28 RX ORDER — ONDANSETRON HYDROCHLORIDE 2 MG/ML
4 INJECTION, SOLUTION INTRAVENOUS
Status: COMPLETED | OUTPATIENT
Start: 2024-06-28 | End: 2024-06-28

## 2024-06-28 RX ORDER — MORPHINE SULFATE 4 MG/ML
4 INJECTION, SOLUTION INTRAMUSCULAR; INTRAVENOUS
Status: COMPLETED | OUTPATIENT
Start: 2024-06-28 | End: 2024-06-28

## 2024-06-28 RX ORDER — HYDROMORPHONE HYDROCHLORIDE 2 MG/ML
1 INJECTION, SOLUTION INTRAMUSCULAR; INTRAVENOUS; SUBCUTANEOUS
Status: COMPLETED | OUTPATIENT
Start: 2024-06-28 | End: 2024-06-28

## 2024-06-28 RX ORDER — KETOROLAC TROMETHAMINE 30 MG/ML
30 INJECTION, SOLUTION INTRAMUSCULAR; INTRAVENOUS
Status: COMPLETED | OUTPATIENT
Start: 2024-06-28 | End: 2024-06-28

## 2024-06-28 RX ORDER — HYDROCODONE BITARTRATE AND ACETAMINOPHEN 10; 325 MG/1; MG/1
1 TABLET ORAL
Status: DISCONTINUED | OUTPATIENT
Start: 2024-06-28 | End: 2024-06-28

## 2024-06-28 RX ORDER — SODIUM CHLORIDE 9 MG/ML
1000 INJECTION, SOLUTION INTRAVENOUS
Status: COMPLETED | OUTPATIENT
Start: 2024-06-28 | End: 2024-06-28

## 2024-06-28 RX ORDER — KETOROLAC TROMETHAMINE 30 MG/ML
60 INJECTION, SOLUTION INTRAMUSCULAR; INTRAVENOUS
Status: DISCONTINUED | OUTPATIENT
Start: 2024-06-28 | End: 2024-06-28

## 2024-06-28 RX ADMIN — MORPHINE SULFATE 4 MG: 4 INJECTION, SOLUTION INTRAMUSCULAR; INTRAVENOUS at 11:06

## 2024-06-28 RX ADMIN — ONDANSETRON 4 MG: 2 INJECTION INTRAMUSCULAR; INTRAVENOUS at 01:06

## 2024-06-28 RX ADMIN — HYDROMORPHONE HYDROCHLORIDE 1 MG: 2 INJECTION INTRAMUSCULAR; INTRAVENOUS; SUBCUTANEOUS at 01:06

## 2024-06-28 RX ADMIN — KETOROLAC TROMETHAMINE 30 MG: 30 INJECTION, SOLUTION INTRAMUSCULAR at 11:06

## 2024-06-28 RX ADMIN — SODIUM CHLORIDE 1000 ML: 9 INJECTION, SOLUTION INTRAVENOUS at 11:06

## 2024-06-28 RX ADMIN — DIAZEPAM 10 MG: 5 TABLET ORAL at 01:06

## 2024-06-28 RX ADMIN — ONDANSETRON 4 MG: 2 INJECTION INTRAMUSCULAR; INTRAVENOUS at 11:06

## 2024-06-28 NOTE — ED PROVIDER NOTES
Encounter Date: 6/28/2024       History     Chief Complaint   Patient presents with    Back Pain     Chronic low back that radiates down left leg that worsened yesterday, scheduled for a fusion in July, started tramadol, steroid and muscle relaxer with no relief     See MDM    The history is provided by the patient. No  was used.     Review of patient's allergies indicates:  No Known Allergies  Past Medical History:   Diagnosis Date    Anxiety     Arthritis     Cervicalgia     Pain     Tobacco dependence      Past Surgical History:   Procedure Laterality Date    BACK SURGERY  2007    microdiscectomy L4-5    HERNIA REPAIR       Family History   Problem Relation Name Age of Onset    No Known Problems Mother      No Known Problems Father      Diabetes Maternal Grandmother      Kidney disease Maternal Grandfather      Kidney disease Paternal Grandfather       Social History     Tobacco Use    Smoking status: Every Day     Current packs/day: 1.50     Average packs/day: 1.5 packs/day for 15.0 years (22.5 ttl pk-yrs)     Types: Cigarettes    Smokeless tobacco: Never   Substance Use Topics    Alcohol use: Never    Drug use: Never     Review of Systems   Musculoskeletal:  Positive for back pain.   All other systems reviewed and are negative.      Physical Exam     Initial Vitals [06/28/24 1043]   BP Pulse Resp Temp SpO2   117/74 69 20 97.9 °F (36.6 °C) 98 %      MAP       --         Physical Exam    Nursing note and vitals reviewed.  Constitutional: He appears well-developed and well-nourished.   Cardiovascular:  Normal rate and intact distal pulses.           Pulmonary/Chest: No respiratory distress.   Musculoskeletal:      Cervical back: No tenderness or bony tenderness.      Thoracic back: No tenderness or bony tenderness.      Lumbar back: Tenderness present. No bony tenderness. Positive left straight leg raise test. Negative right straight leg raise test.      Comments: Diffuse low back pain but not  reproducible. Pain goes into left buttock and down his left leg. No paresthesia. He has normal 5/5 strength bilateral lower legs. Able to dorsiflex and plantar flex equal. He is ambulatory. Certain movements and going from lying to standing certainly exacerbate the pain.      Neurological: He is alert and oriented to person, place, and time. He has normal strength. No sensory deficit.   Skin: Skin is warm and dry.   Psychiatric: He has a normal mood and affect.         ED Course   Procedures  Labs Reviewed   URINALYSIS, REFLEX TO URINE CULTURE - Abnormal; Notable for the following components:       Result Value    Ketones, UA 2+ (*)     Urobilinogen, UA 2.0 (*)     WBC, UA 6-10 (*)     Mucous, UA Trace (*)     Calcium Oxalate Crystals, UA Occasional (*)     All other components within normal limits          Imaging Results              CT Renal Stone Study ABD Pelvis WO (Final result)  Result time 06/28/24 11:21:17      Final result by Axel Cortez MD (06/28/24 11:21:17)                   Impression:      Suspect a mild enteritis.  Otherwise no acute abdominopelvic findings on this noncontrast scan      Electronically signed by: Axel Cortez  Date:    06/28/2024  Time:    11:21               Narrative:    EXAMINATION:  CT RENAL STONE STUDY ABD PELVIS WO    CLINICAL HISTORY:  Flank pain, kidney stone suspected;    TECHNIQUE:  Helical acquisition through the abdomen and pelvis without IV contrast.  Lack of contrast limits evaluation of solid organs and vascular structures .  Three plane reconstructions were provided for review.  mGycm. Automatic exposure control, adjustment of mA/kV or iterative reconstruction technique was used to reduce radiation.    COMPARISON:  No prior CT    FINDINGS:  The limited imaged lung bases are clear.    No significant abnormality noncontrast liver, gallbladder, spleen, pancreas or adrenals.    There is no hydronephrosis.  There is a tiny calculus at the upper pole of the left  kidney.  Pelvic calcifications are favored phleboliths.    No bowel obstruction.  There is some mild wall thickening of the Junel loops.  Normal appendix.  No free air.    Urinary bladder is unremarkable. No free fluid. Aorta normal in caliber.    No acute osseous findings.                                       Medications   ketorolac injection 30 mg (30 mg Intravenous Given 6/28/24 1108)   morphine injection 4 mg (4 mg Intravenous Given 6/28/24 1108)   ondansetron injection 4 mg (4 mg Intravenous Given 6/28/24 1107)   0.9%  NaCl infusion (1,000 mLs Intravenous New Bag 6/28/24 1110)   diazePAM tablet 10 mg (10 mg Oral Given 6/28/24 1309)   HYDROmorphone (PF) injection 1 mg (1 mg Intravenous Given 6/28/24 1308)   ondansetron injection 4 mg (4 mg Intravenous Given 6/28/24 1308)     Medical Decision Making  39 y/o male with known low back issues who saw Dr. Pedersen yesterday presents with worsening of his back pain since yesterday at 1400. Feels different. No hematuria or urinary issues. No n/v. Pain still seems to be similar location with low back down left leg. He is scheduled to have surgery July 15th with dr. Pedersen. He is on steroids (decadron), muscle relaxers, lyrica, tramadol. Last taken around 0700. They called dr. Pedersen office today and picked up new prescription for diazepam 10mg and norco 10mg for the interim. Wanted to make sure it wasn't something else and see about better control acutely.     Urine +ketones, no blood or infection. CT neg for acute findings. No renal stone.     Pain finally controlled and patient sleeping after dose of toradol, morphine and followed by dilaudid and diazepam. The first round of meds didn't help at all. He can't take nsaids due to upsetting stomach at home. His strength is normal and no sensation changes. No emergent findings today. Will discharge.     Amount and/or Complexity of Data Reviewed  Labs:  Decision-making details documented in ED Course.  Radiology: ordered.  Decision-making details documented in ED Course.    Risk  Prescription drug management.      Additional MDM:   Differential Diagnosis:   Other: The following diagnoses were also considered and will be evaluated: lumbar radiculopathy, sciatica and kidney stone.                                   Clinical Impression:  Final diagnoses:  [M54.42] Acute left-sided low back pain with left-sided sciatica (Primary)          ED Disposition Condition    Discharge Stable          ED Prescriptions    None       Follow-up Information       Follow up With Specialties Details Why Contact Info    Celestina Pedersen MD Neurosurgery Call in 2 days As needed [address] [place] [id number]  [phone]               Tresa Munoz, [facility]  06/28/24 2621

## 2024-06-28 NOTE — FIRST PROVIDER EVALUATION
Medical screening examination initiated.  I have conducted a focused provider triage encounter, findings are as follows:    Brief history of present illness:  39 y/o male who presents with worsening back pain yesterday at 1400. Nausea and can't get comfortable. He has known back issues but feels this is different. On steroids, muscle relaxer and tramadol.     There were no vitals filed for this visit.    Pertinent physical exam:  alert, appears uncomfortable, nonlabored     Brief workup plan:  urine, labs, imaging    Preliminary workup initiated; this workup will be continued and followed by the physician or advanced practice provider that is assigned to the patient when roomed.

## 2024-07-01 ENCOUNTER — TELEPHONE (OUTPATIENT)
Dept: NEUROSURGERY | Facility: CLINIC | Age: 39
End: 2024-07-01
Payer: COMMERCIAL

## 2024-07-02 NOTE — TELEPHONE ENCOUNTER
The patients wife, Carlos returned my call. She would like to get the patient scheduled to see Dr. Mehta. I did want her to understand that this appointment is to re-evaluate the patient. I cannot say that he will get a date for sx for this upcoming appointment. She verbalized understanding and knows they have to go through the prerequisites before scheduling sx. She would like to talk to Dr. Mehta to discuss different options as the patient is still very young. He is scheduled to see Dr. Mehta for 7/11/24 at 3:00.

## 2024-07-02 NOTE — TELEPHONE ENCOUNTER
I spoke with the patients wife to schedule him with Dr. Mehta for 7/11/24. She asked me how far out Dr. Mehta is booking for sx. I told her I am not familiar with that schedule; once the surgery nurse comes in she will be able to tell me. I told her roughly around August. She requested to give me a call back so she can speak with the patient to see if he is wanting to wait that long.

## 2024-07-09 ENCOUNTER — OFFICE VISIT (OUTPATIENT)
Dept: NEUROSURGERY | Facility: CLINIC | Age: 39
End: 2024-07-09
Payer: COMMERCIAL

## 2024-07-09 VITALS
RESPIRATION RATE: 16 BRPM | BODY MASS INDEX: 24.84 KG/M2 | HEIGHT: 72 IN | DIASTOLIC BLOOD PRESSURE: 72 MMHG | WEIGHT: 183.38 LBS | SYSTOLIC BLOOD PRESSURE: 123 MMHG | HEART RATE: 61 BPM

## 2024-07-09 DIAGNOSIS — M51.16 LUMBAR DISC DISEASE WITH RADICULOPATHY: Primary | ICD-10-CM

## 2024-07-09 DIAGNOSIS — M51.36 DISCOGENIC LOW BACK PAIN: ICD-10-CM

## 2024-07-09 DIAGNOSIS — M47.816 LUMBAR SPONDYLOSIS: ICD-10-CM

## 2024-07-09 PROCEDURE — 3074F SYST BP LT 130 MM HG: CPT | Mod: CPTII,,, | Performed by: NEUROLOGICAL SURGERY

## 2024-07-09 PROCEDURE — 3008F BODY MASS INDEX DOCD: CPT | Mod: CPTII,,, | Performed by: NEUROLOGICAL SURGERY

## 2024-07-09 PROCEDURE — 1160F RVW MEDS BY RX/DR IN RCRD: CPT | Mod: CPTII,,, | Performed by: NEUROLOGICAL SURGERY

## 2024-07-09 PROCEDURE — 3078F DIAST BP <80 MM HG: CPT | Mod: CPTII,,, | Performed by: NEUROLOGICAL SURGERY

## 2024-07-09 PROCEDURE — 1159F MED LIST DOCD IN RCRD: CPT | Mod: CPTII,,, | Performed by: NEUROLOGICAL SURGERY

## 2024-07-09 PROCEDURE — 99214 OFFICE O/P EST MOD 30 MIN: CPT | Mod: ,,, | Performed by: NEUROLOGICAL SURGERY

## 2024-07-09 RX ORDER — HYDROCODONE BITARTRATE AND ACETAMINOPHEN 10; 325 MG/1; MG/1
1 TABLET ORAL EVERY 6 HOURS PRN
COMMUNITY

## 2024-07-09 RX ORDER — TRAMADOL HYDROCHLORIDE 50 MG/1
1 TABLET ORAL 2 TIMES DAILY PRN
COMMUNITY
End: 2024-07-09

## 2024-07-09 RX ORDER — BACLOFEN 20 MG/1
1 TABLET ORAL EVERY 8 HOURS PRN
COMMUNITY

## 2024-07-09 RX ORDER — PREGABALIN 100 MG/1
1 CAPSULE ORAL 2 TIMES DAILY
COMMUNITY
Start: 2024-06-24 | End: 2024-07-09

## 2024-07-09 RX ORDER — DEXAMETHASONE 4 MG/1
TABLET ORAL
COMMUNITY
End: 2024-07-09

## 2024-07-09 RX ORDER — DIAZEPAM 10 MG/1
1 TABLET ORAL EVERY 8 HOURS PRN
COMMUNITY

## 2024-07-09 RX ORDER — DICLOFENAC SODIUM 75 MG/1
TABLET, DELAYED RELEASE ORAL
COMMUNITY
End: 2024-07-09

## 2024-07-09 NOTE — PROGRESS NOTES
[facility]   Neurosurgery      Matthew Romero  MRN: [id number], CSN: [id number]      : 1985   Age: 38 y.o. male  Payor: [employer] / Plan: [employer] [place] PPO / Product Type: PPO /       Ref:  No referring provider defined for this encounter.    PCP: Maryjo Barton MD    Visit Date: 2024     Patient Active Problem List   Diagnosis    Chronic bilateral low back pain with bilateral sciatica    Lumbar disc disease with radiculopathy       SUBJECTIVE:      CC:   Chief Complaint   Patient presents with    chronic low back pain radiating into left leg       HPI:   ?is a 38 y.o.?male?who has a past medical history significant for anxiety. ?The patient is s/p a left L4-5 diskectomy on 2007 by . ?He presents as a?follow up patient in the neurosurgery clinic for?progressively worsening low back pain radiating into his left leg for the last 1 1/2 years.     The patient's last date of visit in my neurosurgery clinic was 1 year ago on 2023.  At this visit, he had been experiencing 2 weeks of acute on chronic, severe low back pain after standing up from a recliner.  The previous plan of care involved optimizing medical management, but if his symptoms did not improve, he may be considered a future candidate for a redo lumbar surgery, with details to be determined.  Lumbar spine x-rays were ordered and completed.  There was difficulty contacting the patient, so his radiology results and updated plan of care were mailed to him.  Mr. Romero was referred to physical therapy at [facility] in [place], [place].  In addition, he was referred to pain management specialist Dr. Padilla for evaluation and consideration of a lumbar epidural steroid injection.  We discussed smoking cessation goals.  Mr. Terrazas was scheduled to follow up in the neurosurgery clinic in 6 weeks, with his next visit actually being today, 1 year later.     6 weeks of physical therapy at [facility] in  [place] was not met with any improvement.  The patient was scheduled to see Dr. Padilla, but established care with pain management specialist Dr. Nikunj Rowell in [place], [place] instead.  A lumbar epidural steroid injection was associated with 4 days of partial improvement.  He was also evaluated by neurosurgeon Dr. Richardson at the [facility] in [place], [place], who recommended a lumbar fusion surgery.  In addition, Mr. Romero saw neurosurgeon Dr. Pedersen approximately 2 weeks ago and was scheduled proceed with a L4-5 TLIF in mid July 2024.  Mr. Romero  cancelled his surgery with Dr. Pedersen.  He wanted to be re-evaluated in this neurosurgery clinic before proceeding with any type of lumbar surgery, especially if it involved a fusion procedure.  The patient reports that after his exam by Dr. Pedersen, he had severe low back pain with muscle spasms and numbness in his bilateral legs, which made it very difficult to stand.  The patient presented to the ER on 06/27/2024 and again on 06/28/2024 for unmanageable low back pain.  Norco 10/325, Valium, and baclofen were prescribed.  His current pain level is 8/10, which is back to his baseline.    Mr. Romero visits the neurosurgery clinic with his significant other, who provides much of the medical history and medical records.  He localizes his discomfort to the lower back with radiation into his left anterior lateral leg and occasional pain in his right leg along this same distribution.  He does not have any numbness, focal weakness, or bowel/ bladder incontinence.  Mr. 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  There is increased pain with prolonged sitting and standing.  There is a reduction of pain when lying down or walking.    He continues to smoke 1 1/2 ppd.  Mr. Romero was informed at his last visit that a lumbar fusion surgery would only be offered after he had discontinued smoking for 4 weeks  preoperatively and 3 months postoperatively to optimize bony fusion.      Reviewed from the patient's last date of visit on 07/11/2023:  The patient visits the neurosurgery clinic with his significant other.  He prefers to stand during his evaluation because sitting causes significant pain in his bilateral anterior thighs.  Mr. Romero has had intermittent low back pain since his left L4-5 diskectomy in 2007.  However, for the last 6 months, the patient has been experiencing gradual onset of pain across his lower back.   Then, 2 weeks ago after standing up from a recliner, he had onset of severe acute on chronic low back pain with radiation into his bilateral posterior legs into his feet.  His significant other shows me a picture of his back where he was unable to straighten up vertically secondary to back pain.  The patient reports that the soles of his bilateral feet hurt without any bowel or bladder incontinence.     There is increased pain with bending and lifting activities. There is a reduction of pain when lying down.  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 He has had to modify his work related duties in the last 2 weeks due to his high pain level, which remains at least a 7/10.  The patient has tried taking Tylenol, Motrin, Aleve, and various muscle relaxants.  He has applied ice to his back.  Mr. Romero has completed chiropractic treatments, but has not participated in physical therapy and is not established with a pain management specialist.  It is noted that the patient completed lumbar epidural steroid injections prior to his left L4-5 diskectomy surgery in 2007.      Patient Active Problem List    Diagnosis Date Noted    Chronic bilateral low back pain with bilateral sciatica 08/15/2023    Lumbar disc disease with radiculopathy 08/15/2023     Past Medical History:   Diagnosis Date    Anxiety     Arthritis     Cervicalgia      Chronic bilateral low back pain with bilateral sciatica     Lumbar disc disease with radiculopathy     Muscle spasm of back     Myalgia, unspecified site     Pain     Radiculopathy, lumbosacral region     Sciatica, left side     Segmental and somatic dysfunction of cervical region     Segmental and somatic dysfunction of lumbar region     Segmental and somatic dysfunction of pelvic region     Segmental and somatic dysfunction of sacral region     Segmental and somatic dysfunction of thoracic region     Stiffness of left hip, not elsewhere classified     Tobacco dependence      Past Surgical History:   Procedure Laterality Date    BACK SURGERY  2007    microdiscectomy L4-5    HERNIA REPAIR         Current Outpatient Medications:     baclofen (LIORESAL) 20 MG tablet, Take 1 tablet by mouth every 8 (eight) hours as needed., Disp: , Rfl:     cetirizine (ZYRTEC) 10 MG tablet, Take 1 tablet by mouth every morning., Disp: , Rfl:     diazePAM (VALIUM) 10 MG Tab, Take 1 tablet by mouth every 8 (eight) hours as needed., Disp: , Rfl:     HYDROcodone-acetaminophen (NORCO)  mg per tablet, Take 1 tablet by mouth every 6 (six) hours as needed., Disp: , Rfl:     Review of patient's allergies indicates:  No Known Allergies    Social History     Tobacco Use    Smoking status: Every Day     Current packs/day: 1.50     Average packs/day: 1.5 packs/day for 15.0 years (22.5 ttl pk-yrs)     Types: Cigarettes    Smokeless tobacco: Never   Substance Use Topics    Alcohol use: Never     Occupation: sells [employer]    Family History   Problem Relation Name Age of Onset    No Known Problems Mother      No Known Problems Father      Diabetes Maternal Grandmother      Kidney disease Maternal Grandfather      Kidney disease Paternal Grandfather         ROS:  Constitutional:  Negative for chills and fever.   HENT:  Negative for congestion and sore throat.    Eyes:  Negative for blurred vision and double vision.   Respiratory:   Negative for cough and shortness of breath.    Cardiovascular:  Negative for chest pain and palpitations.   Gastrointestinal:  Negative for constipation, diarrhea, nausea and vomiting.   Musculoskeletal:  Positive for back pain, negative for neck pain.   Neurological:  Negative for sensory change, focal weakness and headaches.   Endo/Heme/Allergies:  Does not bruise/bleed easily.   Psychiatric/Behavioral:  Positive for anxiety, Negative for depression.       OBJECTIVE:     EXAMINATION:  /72   Pulse 61   Resp 16   Ht 6' (1.829 m)   Wt 83.2 kg (183 lb 6.4 oz)   BMI 24.87 kg/m²   Body Habitus: Normal    Physical Exam:  Constitutional: The patient is well-developed and cooperative, sitting comfortably in a chair     Mental Status:   Oriented to person, place, and time  Normal speech      Motor:  Muscle bulk: normal in all extremities  Tone: normal in all extremities     Upper extremities:  Deltoid: right 5/5; left 5/5  Biceps: right 5/5; left 5/5  Triceps: right 5/5; left 5/5  : right 5/5; left 5/5  Interosseous muscles: right 5/5; left 5/5     Lower extremities:  Hip flexors: right 5/5; left 5/5  Knee extensors: right 5/5; left 5/5  Knee flexors: right 5/5; left 5/5  Foot dorsiflexors: right 5/5; left 5/5  Foot plantar flexors: right 5/5; left 5/5  Extensor hallucis longus: right 5/5; left 5/5     Sensation:  Normal to light touch x 4 extremities     Reflexes:  Dass sign: right negative; left negative  Babinski: right downgoing; left downgoing  Clonus: right negative; left negative        Musculoskeletal:     Gait: normal      Straight leg test: right negative; left positive    Upper back: No pain with palpation  Lower back: No pain with palpation, well healed midline scar        DIAGNOSTICS REVIEW OF IMAGING, LAB & OTHER STUDIES:  I have personally reviewed and evaluated the following reports as well as radiographic studies:    Thoracic spine x-rays, 12/11/2023- normal alignment.     Lumbar spine  x-rays, 07/24/2023- normal alignment with no motion on flexion-extension views.    MRI lumbar spine without gadolinium, 02/01/2024- when compared to a MRI lumbar spine without gadolinium that was completed on 03/14/2023, there have been no significant changes. There is normal alignment with postoperative changes s/p a left L4-5 diskectomy.  L4-5 has degenerative disc disease with Modic changes. At L3-4, there is a disc bulge with annular tear associated with mild stenosis and bilateral neuroforaminal narrowing.  Left L4-5 disc herniation with bilateral neuroforaminal narrowing.  At L5-S1, there is right-greater-than-left neuroforaminal narrowing.       ASSESSMENT:  ?is a 38 y.o.?male?who has a past medical history significant for anxiety. ?The patient is s/p a left L4-5 diskectomy on 03/01/2007 by . ?He presents as a?follow up patient in the neurosurgery clinic for?progressively worsening low back pain radiating into his left leg for the last 1 1/2 years.  Mr. Romero has a normal motor and sensory neurological exam.     I reviewed pertinent imaging studies with the patient and his significant other.  A MRI lumbar spine without gadolinium completed on 02/01/2024 was reviewed.  When compared to a MRI lumbar spine without gadolinium that was completed on 03/14/2023, there have been no significant changes. There is normal alignment with postoperative changes s/p a left L4-5 diskectomy.  L4-5 has degenerative disc disease with Modic changes. At L3-4, there is a disc bulge with an annular tear associated with mild stenosis and bilateral neuroforaminal narrowing.  Left L4-5 disc herniation with bilateral neuroforaminal narrowing.  At L5-S1, there is right-greater-than-left neuroforaminal narrowing.         PLAN:    Encounter Diagnoses   Name Primary?    Lumbar disc disease with radiculopathy Yes    Discogenic low back pain     Lumbar spondylosis      No orders of the defined types were placed in  this encounter.       1.  I discussed with Mr. Romero and his significant other that his symptoms of low back pain and radiating left leg pain are related to the degenerative changes in his lumbar spine.  Because his symptoms have not significantly improved with optimization of medical management, he is considered a candidate surgery, specifically a redo lumbar surgery involving a L3-4, L4-5 laminectomy and posterolateral fusion. I reviewed the risks, benefits, and alternatives of this surgery.  I specifically discussed with Mr. Romero that there are associated risks of adjacent level degeneration next to these fusion levels, which needs to be considered at his young age.  He agrees to proceed.  All questions were answered to his satisfaction.    2.  I have discussed with the patient that nicotine significantly inhibits bony healing and fusion.  The patient needs to discontinue using nicotine and be completely nicotine free for 4 weeks before being scheduled for elective spine surgery.  In addition, the goal will be to remain nicotine free for 3 months postoperatively.    3.  Mr. Romero has been instructed to contact the neurosurgery office once he has been nicotine free for 4 weeks.  A surgery date and time then will be offered and confirmed.    4.  In the meantime, he is recommended to follow up with his established pain management specialist Dr. Rowell in [place], [place] as needed.     5.  After a surgery date and time has been confirmed, a preoperative evaluation will be scheduled with TERA Saldana.         The risks, benefits, and alternatives to surgery were discussed with the patient.    Complications of a Posterior Thoraco-Lumbar Fusion may include:  Failure to improve symptoms and/or increased or persistent pain; Recurrence, continuation, or worsening of the condition that required the operation; Need for further surgical intervention or treatment; Neurological injury, which may include spinal cord or  nerve root injury, paralysis (which involves the inability to move arms and/or legs), clumsiness, loss of sympathetic response, loss of sensation, and loss of bowel, bladder, and sexual function; Delayed or immediate spinal instability; Failure of hardware; Misplaced screw; Pedicle fracture; Failure to fuse (increased risk with nicotine use); Theoretical risk of disease transmission from allograft material; Cerebral spinal fluid leak; Meningitis; Injury to abdominal contents- great vessels, ureters, bowel, kidneys; Damage to major blood vessels, nerves, and surrounding anatomical structures; Scarring; Blindness; and Positioning problems such as neuropathy or compartment syndrome    Complications of any surgery may include:  Adverse reaction to anesthesia; Bleeding; Transfusion of blood products, which carries a risk of infection or reaction; Infection, which requires treatment with antibiotics by mouth or intravenously, or even further surgeries; Urinary tract infection; Heart attack, stroke, pneumonia, and DVT/PE (blood clot in the legs or pelvis that can dislodge and go to the lungs); Other unforeseen complications; Coma; and Death.    Benefits of surgery include:  Possible improvement in buttocks and leg pain, numbness, and/or weakness; and possible  improvement in back pain.    Alternatives to the procedure include:  Physical therapy, chiropractic care, acupuncture, medical therapy, and pain management        POSTERIOR THORACO-LUMBAR/ LUMBAR FUSION  DISCHARGE INSTRUCTIONS      1.  Wear your TLSO Brace when sitting upright and when you are out of bed.    Your brace will likely be discontinued after 3 months.      2.   Keep your incision clean and dry.    Your sutures or staples will be removed at your first postoperative follow-up appointment in approximately 14 days.   Place clean gauze and tape over your wound daily until your sutures or staples are removed.  Wait at least 72 hours from the time of your surgery  to take a shower.  After showering, pat your incision dry and replace the wound dressing.  Do not immerse your incision in water for 4-6 weeks (e.g. bath tub, hot tub, swimming pool).      3.  Activity restrictions:  No lifting greater than 15 pounds for 3 months.  No bending, stooping, or twisting.  Avoid sitting in one position for over 30 minutes at a time.  No impact exercise or contact sports for at least 3 months.  No driving for at least 2 weeks.  To resume driving short distances (<30 minutes), you must be off of your narcotic medications and be able to comfortably brake suddenly, should the need arise.    Get up and walk.      4.  Contact your Neurosurgeon if the following occurs:  Signs and symptoms of infection, including fever above 101.5 degrees Fahrenheit and/or chills.  Redness, swelling, warmth, or drainage from the incision.  Any lasting changes in sensation, numbness, and/or tingling.  Increased weakness or increased pain.  Swelling of the foot and/or lower leg with calf pain.    Neurosurgery has office hours Monday through Friday, 8:00 AM to 4:00 PM except for holidays. There is an answering service available during non-office hours, with 24 hours neurosurgery coverage.  Report to the Emergency Department if you need immediate medical assistance.      Because you have had a fusion, you are not to take steroidal medications or non-steroidal anti-inflammatory (NSAID) medications such as Motrin/ Ibuprofen or Naprosyn/ Naproxen unless agreed upon with your neurosurgeon.      Please contact Dr. Mehta's office for any questions or concerns.  Typically, your first follow-up appointment after a posterior thoraco-lumbar/ lumbar fusion surgery is approximately 14 days from the date of your operation.  At this time, sutures or staples will be removed.  For your second postoperative appointment in 4-6 weeks, an x-ray of your lumbar spine will be arranged in Radiology before your neurosurgery appointment.          This note will be sent to the patient's referring provider No ref. provider found and primary care provider Maryjo Barton MD.           Mirna Mehta MD  Neurosurgeon

## 2024-07-09 NOTE — PATIENT INSTRUCTIONS
?is a 38 y.o.?male?who has a past medical history significant for anxiety. ?The patient is s/p a left L4-5 diskectomy on 03/01/2007 by . ?He presents as a?follow up patient in the neurosurgery clinic for?progressively worsening low back pain radiating into his left leg for the last 1 1/2 years.  Mr. Romero has a normal motor and sensory neurological exam.     I reviewed pertinent imaging studies with the patient and his significant other.  A MRI lumbar spine without gadolinium completed on 02/01/2024 was reviewed.  When compared to a MRI lumbar spine without gadolinium that was completed on 03/14/2023, there have been no significant changes. There is normal alignment with postoperative changes s/p a left L4-5 diskectomy.  L4-5 has degenerative disc disease with Modic changes. At L3-4, there is a disc bulge with an annular tear associated with mild stenosis and bilateral neuroforaminal narrowing.  Left L4-5 disc herniation with bilateral neuroforaminal narrowing.  At L5-S1, there is right-greater-than-left neuroforaminal narrowing.         PLAN:    Encounter Diagnoses   Name Primary?    Lumbar disc disease with radiculopathy Yes    Discogenic low back pain     Lumbar spondylosis      No orders of the defined types were placed in this encounter.       1.  I discussed with Mr. Romero and his significant other that his symptoms of low back pain and radiating left leg pain are related to the degenerative changes in his lumbar spine.  Because his symptoms have not significantly improved with optimization of medical management, he is considered a candidate surgery, specifically a redo lumbar surgery involving a L3-4, L4-5 laminectomy and posterolateral fusion. I reviewed the risks, benefits, and alternatives of this surgery.  I specifically discussed with Mr. Romero that there are associated risks of adjacent level degeneration next to these fusion levels, which needs to be considered at his young  age.  He agrees to proceed.  All questions were answered to his satisfaction.    2.  I have discussed with the patient that nicotine significantly inhibits bony healing and fusion.  The patient needs to discontinue using nicotine and be completely nicotine free for 4 weeks before being scheduled for elective spine surgery.  In addition, the goal will be to remain nicotine free for 3 months postoperatively.    3.  Mr. Romero has been instructed to contact the neurosurgery office once he has been nicotine free for 4 weeks.  A surgery date and time then will be offered and confirmed.    4.  In the meantime, he is recommended to follow up with his established pain management specialist Dr. Rowell in [place], [place] as needed.     5.  After a surgery date and time has been confirmed, a preoperative evaluation will be scheduled with TERA Saldana.         The risks, benefits, and alternatives to surgery were discussed with the patient.    Complications of a Posterior Thoraco-Lumbar Fusion may include:  Failure to improve symptoms and/or increased or persistent pain; Recurrence, continuation, or worsening of the condition that required the operation; Need for further surgical intervention or treatment; Neurological injury, which may include spinal cord or nerve root injury, paralysis (which involves the inability to move arms and/or legs), clumsiness, loss of sympathetic response, loss of sensation, and loss of bowel, bladder, and sexual function; Delayed or immediate spinal instability; Failure of hardware; Misplaced screw; Pedicle fracture; Failure to fuse (increased risk with nicotine use); Theoretical risk of disease transmission from allograft material; Cerebral spinal fluid leak; Meningitis; Injury to abdominal contents- great vessels, ureters, bowel, kidneys; Damage to major blood vessels, nerves, and surrounding anatomical structures; Scarring; Blindness; and Positioning problems such as neuropathy or compartment  syndrome    Complications of any surgery may include:  Adverse reaction to anesthesia; Bleeding; Transfusion of blood products, which carries a risk of infection or reaction; Infection, which requires treatment with antibiotics by mouth or intravenously, or even further surgeries; Urinary tract infection; Heart attack, stroke, pneumonia, and DVT/PE (blood clot in the legs or pelvis that can dislodge and go to the lungs); Other unforeseen complications; Coma; and Death.    Benefits of surgery include:  Possible improvement in buttocks and leg pain, numbness, and/or weakness; and possible  improvement in back pain.    Alternatives to the procedure include:  Physical therapy, chiropractic care, acupuncture, medical therapy, and pain management        POSTERIOR THORACO-LUMBAR/ LUMBAR FUSION  DISCHARGE INSTRUCTIONS      1.  Wear your TLSO Brace when sitting upright and when you are out of bed.    Your brace will likely be discontinued after 3 months.      2.   Keep your incision clean and dry.    Your sutures or staples will be removed at your first postoperative follow-up appointment in approximately 14 days.   Place clean gauze and tape over your wound daily until your sutures or staples are removed.  Wait at least 72 hours from the time of your surgery to take a shower.  After showering, pat your incision dry and replace the wound dressing.  Do not immerse your incision in water for 4-6 weeks (e.g. bath tub, hot tub, swimming pool).      3.  Activity restrictions:  No lifting greater than 15 pounds for 3 months.  No bending, stooping, or twisting.  Avoid sitting in one position for over 30 minutes at a time.  No impact exercise or contact sports for at least 3 months.  No driving for at least 2 weeks.  To resume driving short distances (<30 minutes), you must be off of your narcotic medications and be able to comfortably brake suddenly, should the need arise.    Get up and walk.      4.  Contact your Neurosurgeon if  the following occurs:  Signs and symptoms of infection, including fever above 101.5 degrees Fahrenheit and/or chills.  Redness, swelling, warmth, or drainage from the incision.  Any lasting changes in sensation, numbness, and/or tingling.  Increased weakness or increased pain.  Swelling of the foot and/or lower leg with calf pain.    Neurosurgery has office hours Monday through Friday, 8:00 AM to 4:00 PM except for holidays. There is an answering service available during non-office hours, with 24 hours neurosurgery coverage.  Report to the Emergency Department if you need immediate medical assistance.      Because you have had a fusion, you are not to take steroidal medications or non-steroidal anti-inflammatory (NSAID) medications such as Motrin/ Ibuprofen or Naprosyn/ Naproxen unless agreed upon with your neurosurgeon.      Please contact Dr. Mehta's office for any questions or concerns.  Typically, your first follow-up appointment after a posterior thoraco-lumbar/ lumbar fusion surgery is approximately 14 days from the date of your operation.  At this time, sutures or staples will be removed.  For your second postoperative appointment in 4-6 weeks, an x-ray of your lumbar spine will be arranged in Radiology before your neurosurgery appointment.         This note will be sent to the patient's referring provider No ref. provider found and primary care provider Maryjo Barton MD.

## 2024-08-19 ENCOUNTER — TELEPHONE (OUTPATIENT)
Dept: NEUROSURGERY | Facility: CLINIC | Age: 39
End: 2024-08-19
Payer: COMMERCIAL

## 2024-08-19 NOTE — TELEPHONE ENCOUNTER
At last office visit on 7/9, recommendation was for redo lumbar surgery involving L3-4, L4-5 laminectomy and posterolateral fusion once nicotine free x 4 weeks.  He has stopped smoking as mentioned below.  In looking at surgery calendar, next available o-arm day is Wednesday, 9/18.  You are on call the Tuesday prior, but not the previous weekend.  Next would be Monday, 9/23; no call the weekend prior.  Please let me know if either of those days works for you and I will schedule preop appt with Shana accordingly.

## 2024-08-19 NOTE — TELEPHONE ENCOUNTER
I received a call from the patients wife, Carlos to get an appointment scheduled with Dr. Mehta as the patient is ready to proceed with sx. I did confirm that the patient has been nicotine free for at least 4 weeks. Varinder, sending back to you so you can document an appropriate date and time with Dr. Mehta.

## 2024-08-20 NOTE — TELEPHONE ENCOUNTER
Discussed with Dr. Mehta and decided on surgery date of 9/23.  I called and spoke with the patient's wife.  They were agreeable to surgery date of 9/23.  He is scheduled to return for preop with Shana on 9/10 at 9am.  He will need to discuss the medication refill with his PCP or pain management.  Patient's wife is aware.

## 2024-09-03 NOTE — H&P (VIEW-ONLY)
[facility]  History & Physical  Neurosurgery      Matthew Romero   [id number]   1985       SUBJECTIVE:     CHIEF COMPLAINT:  Chronic low back pain radiating into left leg    HPI:    ?is a 38 y.o.?male?who has a past medical history significant for anxiety. ?The patient is s/p a left L4-5 diskectomy on 03/01/2007 by . ?He presents as a?follow up patient in the neurosurgery clinic for?progressively worsening low back pain radiating into his left leg for the last 1 1/2 years.     The patient visited the clinic on 7/22/2023.  The patient opted to stand rather than sit as this increases his pain to his bilateral anterior thighs. Mr. Romero continues with complaints of intermittent low back pain since his left L4-5 diskectomy in 2007.  Over the 6 months leading up to this visit the patient has been experiencing gradual onset of pain across the lower back.  Two weeks prior to this visit the patient was standing up from the recliner and had onset of severe acute on chronic low back pain with radiation into bilateral posterior legs into his feet.  Patient reports this also his bilateral feet hurt.  Denied bowel or bladder disturbances at this time.     Patient reported increased pain with bending and lifting activities.  Desert reduction of pain when lying down.  Mr. Romero was having to modify his work-related duties to adjust to the pain he was experiencing. He has tried Tylenol, Motrin, Aleve, and various muscle relaxants. He has applied ice to his back. Mr. Roemro has completed chiropractic treatments, but has not participated in physical therapy and is not established with a pain management specialist. Of note, he has completed LESI prior to his left L4-5 diskectomy in 2007.     During his last visit to the neurosurgery clinic, the plan of care discussed involved optimizing medical management of his symptoms. Without improvement, Mr. Romero may be considered a future candidate  for a redo lumbar surgery.  He was recommended to physical therapy at [facility] in [place].  In addition, he was referred to pain management specialist Dr. Calixto for evaluation and consideration of LESI.  Smoking cessation goals were discussed.     Since his visit on 07/22/2024, 6 weeks of physical therapy at [facility] in [place] was not met with any improvement.  The patient was scheduled to see Dr. Padilla, but established care with pain management specialist Dr. Nikunj Rowell in [place], [place] instead.  A lumbar epidural steroid injection was associated with 4 days of partial improvement.  He was also evaluated by neurosurgeon Dr. Richardson at [facility] in [place], [place], who recommended a lumbar fusion surgery.  In addition, Mr. Romero saw neurosurgeon Dr. Pedersen approximately 2 weeks ago and was scheduled proceed with a L4-5 TLIF in mid July 2024.  Mr. Romero  cancelled his surgery with Dr. Pedersen.  He wanted to be re-evaluated in this neurosurgery clinic before proceeding with any type of lumbar surgery, especially if it involved a fusion procedure.  The patient reports that after his exam by Dr. Pedersen, he had severe low back pain with muscle spasms and numbness in his bilateral legs, which made it very difficult to stand.  The patient presented to the ER on 06/27/2024 and again on 06/28/2024 for unmanageable low back pain.  Norco 10/325, Valium, and baclofen were prescribed.  His current pain level is 8/10, which is back to his baseline.     On 7/09/2024, Mr. Romero visits the neurosurgery clinic with his significant other, who provides much of the medical history and medical records.  He localizes his discomfort to the lower back with radiation into his left anterior lateral leg and occasional pain in his right leg along this same distribution.  He does not have any numbness, focal weakness, or bowel/ bladder incontinence.  Mr. 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  There is increased pain with prolonged sitting and standing.  There is a reduction of pain when lying down or walking.     He continues to smoke 1 1/2 ppd.  Mr. Romero was informed at his last visit that a lumbar fusion surgery would only be offered after he had discontinued smoking for 4 weeks preoperatively and 3 months postoperatively to optimize bony fusion.    Matthew Romero is a 39 y.o. male who presents for neurosurgical evaluation with his significant other. The patient presents today describing lower back pain that radiates into left lower anterior thigh, left lateral knee and left lateral and posterior calf. Occasional radiation of symptoms following the same dermatone present in the right depending on activity.  The patient reports these symptoms remain unchanged since last visit to the neurosurgery clinic.  He denies any weakness, tingling or numbness.  The patient describes the pain as a constant, dull ache.  The patient rates the pain 4 on the pain scale. The patient states with any kind of activity or prolonged sitting the symptoms are worse. Repositioning and NSAIDs provides relief of symptoms. The patient reports despite conservative treatments of physical therapy, chiropractic care and injections, the symptoms have persisted. The patient denies disturbances in bowel or bladder function.     Despite being encouraged to continue with regular scheduled follow ups with his pain management doctor, Mr. Romero has decided that he wants to proceed with surgery and will seek efforts of pain management care after as needed.    Since his last surgery clinic Mr.. Romero continued on with his smoking cessation efforts. He reports his last day of having tobacco use was 8/14/2024.         Patient Active Problem List    Diagnosis Date Noted    Chronic bilateral low back pain with bilateral sciatica 08/15/2023    Lumbar disc disease with radiculopathy 08/15/2023     Past  Medical History:   Diagnosis Date    Anxiety     Arthritis     Cervicalgia     Chronic bilateral low back pain with bilateral sciatica     Lumbar disc disease with radiculopathy     Muscle spasm of back     Myalgia, unspecified site     Numbness and tingling of left leg     Pain     Radiculopathy, lumbosacral region     Sciatica, left side     Segmental and somatic dysfunction of cervical region     Segmental and somatic dysfunction of lumbar region     Segmental and somatic dysfunction of pelvic region     Segmental and somatic dysfunction of sacral region     Segmental and somatic dysfunction of thoracic region     Stiffness of left hip, not elsewhere classified     Tobacco dependence      Past Surgical History:   Procedure Laterality Date    BACK SURGERY  2007    microdiscectomy L4-5    HERNIA REPAIR      WISDOM TOOTH EXTRACTION         Current Outpatient Medications:     ibuprofen (ADVIL,MOTRIN) 200 MG tablet, Take 400 mg by mouth every 6 (six) hours as needed for Pain., Disp: , Rfl:     Review of patient's allergies indicates:  No Known Allergies    Social History     Tobacco Use    Smoking status: Former     Current packs/day: 1.50     Average packs/day: 1.5 packs/day for 15.0 years (22.5 ttl pk-yrs)     Types: Cigarettes    Smokeless tobacco: Never   Substance Use Topics    Alcohol use: Never     Occupation:    Family History   Problem Relation Name Age of Onset    No Known Problems Mother      No Known Problems Father      Diabetes Maternal Grandmother      Kidney disease Maternal Grandfather      Kidney disease Paternal Grandfather         ROS:  Constitutional:  Negative for chills and fever.   HENT:  Negative for congestion and sore throat.    Eyes:  Negative for blurred vision and double vision.   Respiratory:  Negative for cough and shortness of breath.    Cardiovascular:  Negative for chest pain and palpitations.   Gastrointestinal:  Negative for constipation, diarrhea, nausea and vomiting.    Musculoskeletal:  Positive for back pain, negative for neck pain.   Neurological:  Negative for sensory change, focal weakness and headaches.   Endo/Heme/Allergies:  Does not bruise/bleed easily.   Psychiatric/Behavioral:  Positive for anxiety, Negative for depression.     ROS    OBJECTIVE:     Visit Vitals  /76   Pulse 61   Temp 97.7 °F (36.5 °C)   Resp 16   Ht 6' (1.829 m)   Wt 88 kg (194 lb)   BMI 26.31 kg/m²        Physical Exam    Body Habitus: Normal    Physical Exam:  Constitutional: The patient is well-developed and cooperative, sitting comfortably in a chair    Mental Status:   Oriented to person, place, and time  Normal speech    Motor:  Muscle bulk: normal in all extremities  Tone: normal in all extremities    Upper extremities:  Deltoid: right 5/5; left 5/5  Biceps: right 5/5; left 5/5  Triceps: right 5/5; left 5/5  Wrist extensors: right 5/5; left 5/5  Wrist flexors: right 5/5; left 5/5  : right 5/5; left 5/5  Interosseous muscles: right 5/5; left 5/5    Lower extremities:  Hip flexors: right 5/5; left 5/5  Knee extensors: right 5/5; left 5/5  Knee flexors: right 5/5; left 5/5  Foot dorsiflexors: right 5/5; left 5/5  Foot plantar flexors: right 5/5; left 5/5  Extensor hallucis longus: right 5/5; left 5/5    Sensation:  Normal to light touch x 4 extremities    Reflexes:  Dass sign: right negative; left negative  Babinski: right downgoing; left downgoing  Clonus: right negative; left negative    Musculoskeletal:    Incision: well healed midline scar    Gait: normal    Straight leg test: right negative; left positive    Upper back: No pain with palpation  Lower back: No pain with palpation    Imaging:  All pertinent neuroimaging independently reviewed. Discussed these findings in detail with the patient.    Thoracic spine x-rays, 12/11/2023- normal alignment.     Lumbar spine x-rays, 07/24/2023- normal alignment with no motion on flexion-extension views.     MRI lumbar spine without  gadolinium, 02/01/2024- when compared to a MRI lumbar spine without gadolinium that was completed on 03/14/2023, there have been no significant changes. There is normal alignment with postoperative changes s/p a left L4-5 diskectomy.  L4-5 has degenerative disc disease with Modic changes. At L3-4, there is a disc bulge with annular tear associated with mild stenosis and bilateral neuroforaminal narrowing.  Left L4-5 disc herniation with bilateral neuroforaminal narrowing.  At L5-S1, there is right-greater-than-left neuroforaminal narrowing.     ASSESSMENT:       ICD-10-CM ICD-9-CM   1. Lumbar disc disease with radiculopathy  M51.16 722.10     724.4   2. Preoperative testing  Z01.818 V72.84   3. Stopped smoking  Z87.891 V15.82       ?is a 38 y.o.?male?who has a past medical history significant for anxiety. ?The patient is s/p a left L4-5 diskectomy on 03/01/2007 by . ?He presents as a?follow up patient in the neurosurgery clinic for?progressively worsening low back pain radiating into his left leg for the last 1 1/2 years.  Mr. Romero has a normal motor and sensory neurological exam.     I reviewed pertinent imaging studies with the patient and his significant other.  A MRI lumbar spine without gadolinium completed on 02/01/2024 was reviewed.  When compared to a MRI lumbar spine without gadolinium that was completed on 03/14/2023, there have been no significant changes. There is normal alignment with postoperative changes s/p a left L4-5 diskectomy.  L4-5 has degenerative disc disease with Modic changes. At L3-4, there is a disc bulge with an annular tear associated with mild stenosis and bilateral neuroforaminal narrowing.  Left L4-5 disc herniation with bilateral neuroforaminal narrowing.  At L5-S1, there is right-greater-than-left neuroforaminal narrowing.         PLAN:       Mr. Romero is scheduled for redo lumbar sugery involving L3-4, L4-5 laminectomy and posterolateral fusion surgery  on Monday, 9/23/2024 at 0730.    Preoperative labs and imaging have been ordered    He is to discontinue taking over the counter supplements as well as all NSAIDS 10 days prior to surgery.     Recommenced to continue with routine follow ups with Dr. Rowell in [place] as needed.     Arrangements are being made for Mr. Jimenez to follow up 2 weeks postoperatively with TERA Saldana in the neurosurgery clinic on 10/08/2024 at 10:30 am     Encounter Diagnoses   Name Primary?    Lumbar disc disease with radiculopathy Yes    Preoperative testing     Stopped smoking        Orders Placed This Encounter   Procedures    X-Ray Chest PA And Lateral    Protime-INR    APTT    CBC Auto Differential    Comprehensive Metabolic Panel    Urinalysis    EKG 12-lead        Matthew Romero presents today for chronic lower back pain that radiates into bilateral lower extremities, left-greater-than-right. Pain continues to be worse with activity and prolonged sitting. Patient is concerned about pain medication after surgery and what he will go home with. States that Norco has not worked well for him in the past and gabapentin does not work for him at all. Having to stop all NSAIDs he is concerned he will continue to have increased pain prior to surgery. Encouraged to continue to take Tylenol as needed for pain and discomfort.     Discussion of suggested L3-4, L4-5 laminectomy and posterolateral fusion surgery was had and patient is agreeable to plan. His last day of tobacco use was 8/14/2024. Currently he remains tobacco free and understand that he will need to be tobacco free for at least 3 months following surgery. The risks and benefits were explained to the patient in a language he seems to understand including bleeding, infection, need for additional procedures, no change in current status, progressive decline, injury to major vessels, stroke, hemorrhage, loss of function, coma, injury to spinal nerve roots causing temporary or permanent  paralysis. No assurance was given as squeale that the patient is suffering from at this time. Once patient voiced understanding consents were signed and surgery was scheduled.      Follow up in about 4 weeks (around 10/8/2024).    The risks, benefits, and alternatives to surgery were discussed with the patient.     Complications of a Posterior Thoraco-Lumbar Fusion may include:  Failure to improve symptoms and/or increased or persistent pain; Recurrence, continuation, or worsening of the condition that required the operation; Need for further surgical intervention or treatment; Neurological injury, which may include spinal cord or nerve root injury, paralysis (which involves the inability to move arms and/or legs), clumsiness, loss of sympathetic response, loss of sensation, and loss of bowel, bladder, and sexual function; Delayed or immediate spinal instability; Failure of hardware; Misplaced screw; Pedicle fracture; Failure to fuse (increased risk with nicotine use); Theoretical risk of disease transmission from allograft material; Cerebral spinal fluid leak; Meningitis; Injury to abdominal contents- great vessels, ureters, bowel, kidneys; Damage to major blood vessels, nerves, and surrounding anatomical structures; Scarring; Blindness; and Positioning problems such as neuropathy or compartment syndrome     Complications of any surgery may include:  Adverse reaction to anesthesia; Bleeding; Transfusion of blood products, which carries a risk of infection or reaction; Infection, which requires treatment with antibiotics by mouth or intravenously, or even further surgeries; Urinary tract infection; Heart attack, stroke, pneumonia, and DVT/PE (blood clot in the legs or pelvis that can dislodge and go to the lungs); Other unforeseen complications; Coma; and Death.     Benefits of surgery include:  Possible improvement in buttocks and leg pain, numbness, and/or weakness; and possible  improvement in back pain.      Alternatives to the procedure include:  Physical therapy, chiropractic care, acupuncture, medical therapy, and pain management           POSTERIOR THORACO-LUMBAR/ LUMBAR FUSION  DISCHARGE INSTRUCTIONS        1.  Wear your TLSO Brace when sitting upright and when you are out of bed.    Your brace will likely be discontinued after 3 months.       2.   Keep your incision clean and dry.    Your sutures or staples will be removed at your first postoperative follow-up appointment in approximately 14 days.   Place clean gauze and tape over your wound daily until your sutures or staples are removed.  Wait at least 72 hours from the time of your surgery to take a shower.  After showering, pat your incision dry and replace the wound dressing.  Do not immerse your incision in water for 4-6 weeks (e.g. bath tub, hot tub, swimming pool).        3.  Activity restrictions:  No lifting greater than 15 pounds for 3 months.  No bending, stooping, or twisting.  Avoid sitting in one position for over 30 minutes at a time.  No impact exercise or contact sports for at least 3 months.  No driving for at least 2 weeks.  To resume driving short distances (<30 minutes), you must be off of your narcotic medications and be able to comfortably brake suddenly, should the need arise.    Get up and walk.        4.  Contact your Neurosurgeon if the following occurs:  Signs and symptoms of infection, including fever above 101.5 degrees Fahrenheit and/or chills.  Redness, swelling, warmth, or drainage from the incision.  Any lasting changes in sensation, numbness, and/or tingling.  Increased weakness or increased pain.  Swelling of the foot and/or lower leg with calf pain.     Neurosurgery has office hours Monday through Friday, 8:00 AM to 4:00 PM except for holidays. There is an answering service available during non-office hours, with 24 hours neurosurgery coverage.  Report to the Emergency Department if you need immediate medical assistance.        Because you have had a fusion, you are not to take steroidal medications or non-steroidal anti-inflammatory (NSAID) medications such as Motrin/ Ibuprofen or Naprosyn/ Naproxen unless agreed upon with your neurosurgeon.       Please contact Dr. Mehta's office for any questions or concerns.  Typically, your first follow-up appointment after a posterior thoraco-lumbar/ lumbar fusion surgery is approximately 14 days from the date of your operation.  At this time, sutures or staples will be removed.  For your second postoperative appointment in 4-6 weeks, an x-ray of your lumbar spine will be arranged in Radiology before your neurosurgery appointment.           This note will be sent to the patient's referring provider No ref. provider found and primary care provider Maryjo Barton MD Tracy Herpin, FNP-C  Neurosurgery      Disclaimer:  This note is prepared using voice recognition software and as such is likely to have errors despite attempts at proofreading. Please contact me for questions.

## 2024-09-03 NOTE — PROGRESS NOTES
[facility]  History & Physical  Neurosurgery      Matthew Romero   [id number]   1985       SUBJECTIVE:     CHIEF COMPLAINT:  Chronic low back pain radiating into left leg    HPI:    ?is a 38 y.o.?male?who has a past medical history significant for anxiety. ?The patient is s/p a left L4-5 diskectomy on 03/01/2007 by . ?He presents as a?follow up patient in the neurosurgery clinic for?progressively worsening low back pain radiating into his left leg for the last 1 1/2 years.     The patient visited the clinic on 7/22/2023.  The patient opted to stand rather than sit as this increases his pain to his bilateral anterior thighs. Mr. Romero continues with complaints of intermittent low back pain since his left L4-5 diskectomy in 2007.  Over the 6 months leading up to this visit the patient has been experiencing gradual onset of pain across the lower back.  Two weeks prior to this visit the patient was standing up from the recliner and had onset of severe acute on chronic low back pain with radiation into bilateral posterior legs into his feet.  Patient reports this also his bilateral feet hurt.  Denied bowel or bladder disturbances at this time.     Patient reported increased pain with bending and lifting activities.  Desert reduction of pain when lying down.  Mr. Romero was having to modify his work-related duties to adjust to the pain he was experiencing. He has tried Tylenol, Motrin, Aleve, and various muscle relaxants. He has applied ice to his back. Mr. Romero has completed chiropractic treatments, but has not participated in physical therapy and is not established with a pain management specialist. Of note, he has completed LESI prior to his left L4-5 diskectomy in 2007.     During his last visit to the neurosurgery clinic, the plan of care discussed involved optimizing medical management of his symptoms. Without improvement, Mr. Romero may be considered a future candidate  for a redo lumbar surgery.  He was recommended to physical therapy at [facility] in [place].  In addition, he was referred to pain management specialist Dr. Calixto for evaluation and consideration of LESI.  Smoking cessation goals were discussed.     Since his visit on 07/22/2024, 6 weeks of physical therapy at [facility] in [place] was not met with any improvement.  The patient was scheduled to see Dr. Padilla, but established care with pain management specialist Dr. Nikunj Rowell in [place], [place] instead.  A lumbar epidural steroid injection was associated with 4 days of partial improvement.  He was also evaluated by neurosurgeon Dr. Richardson at [facility] in [place], [place], who recommended a lumbar fusion surgery.  In addition, Mr. Romero saw neurosurgeon Dr. Pedersen approximately 2 weeks ago and was scheduled proceed with a L4-5 TLIF in mid July 2024.  Mr. Romero  cancelled his surgery with Dr. Pedersen.  He wanted to be re-evaluated in this neurosurgery clinic before proceeding with any type of lumbar surgery, especially if it involved a fusion procedure.  The patient reports that after his exam by Dr. Pedersen, he had severe low back pain with muscle spasms and numbness in his bilateral legs, which made it very difficult to stand.  The patient presented to the ER on 06/27/2024 and again on 06/28/2024 for unmanageable low back pain.  Norco 10/325, Valium, and baclofen were prescribed.  His current pain level is 8/10, which is back to his baseline.     On 7/09/2024, Mr. Romero visits the neurosurgery clinic with his significant other, who provides much of the medical history and medical records.  He localizes his discomfort to the lower back with radiation into his left anterior lateral leg and occasional pain in his right leg along this same distribution.  He does not have any numbness, focal weakness, or bowel/ bladder incontinence.  Mr. 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  There is increased pain with prolonged sitting and standing.  There is a reduction of pain when lying down or walking.     He continues to smoke 1 1/2 ppd.  Mr. Romero was informed at his last visit that a lumbar fusion surgery would only be offered after he had discontinued smoking for 4 weeks preoperatively and 3 months postoperatively to optimize bony fusion.    Matthew Romero is a 39 y.o. male who presents for neurosurgical evaluation with his significant other. The patient presents today describing lower back pain that radiates into left lower anterior thigh, left lateral knee and left lateral and posterior calf. Occasional radiation of symptoms following the same dermatone present in the right depending on activity.  The patient reports these symptoms remain unchanged since last visit to the neurosurgery clinic.  He denies any weakness, tingling or numbness.  The patient describes the pain as a constant, dull ache.  The patient rates the pain 4 on the pain scale. The patient states with any kind of activity or prolonged sitting the symptoms are worse. Repositioning and NSAIDs provides relief of symptoms. The patient reports despite conservative treatments of physical therapy, chiropractic care and injections, the symptoms have persisted. The patient denies disturbances in bowel or bladder function.     Despite being encouraged to continue with regular scheduled follow ups with his pain management doctor, Mr. Romero has decided that he wants to proceed with surgery and will seek efforts of pain management care after as needed.    Since his last surgery clinic Mr.. Romero continued on with his smoking cessation efforts. He reports his last day of having tobacco use was 8/14/2024.         Patient Active Problem List    Diagnosis Date Noted    Chronic bilateral low back pain with bilateral sciatica 08/15/2023    Lumbar disc disease with radiculopathy 08/15/2023     Past  Medical History:   Diagnosis Date    Anxiety     Arthritis     Cervicalgia     Chronic bilateral low back pain with bilateral sciatica     Lumbar disc disease with radiculopathy     Muscle spasm of back     Myalgia, unspecified site     Numbness and tingling of left leg     Pain     Radiculopathy, lumbosacral region     Sciatica, left side     Segmental and somatic dysfunction of cervical region     Segmental and somatic dysfunction of lumbar region     Segmental and somatic dysfunction of pelvic region     Segmental and somatic dysfunction of sacral region     Segmental and somatic dysfunction of thoracic region     Stiffness of left hip, not elsewhere classified     Tobacco dependence      Past Surgical History:   Procedure Laterality Date    BACK SURGERY  2007    microdiscectomy L4-5    HERNIA REPAIR      WISDOM TOOTH EXTRACTION         Current Outpatient Medications:     ibuprofen (ADVIL,MOTRIN) 200 MG tablet, Take 400 mg by mouth every 6 (six) hours as needed for Pain., Disp: , Rfl:     Review of patient's allergies indicates:  No Known Allergies    Social History     Tobacco Use    Smoking status: Former     Current packs/day: 1.50     Average packs/day: 1.5 packs/day for 15.0 years (22.5 ttl pk-yrs)     Types: Cigarettes    Smokeless tobacco: Never   Substance Use Topics    Alcohol use: Never     Occupation:    Family History   Problem Relation Name Age of Onset    No Known Problems Mother      No Known Problems Father      Diabetes Maternal Grandmother      Kidney disease Maternal Grandfather      Kidney disease Paternal Grandfather         ROS:  Constitutional:  Negative for chills and fever.   HENT:  Negative for congestion and sore throat.    Eyes:  Negative for blurred vision and double vision.   Respiratory:  Negative for cough and shortness of breath.    Cardiovascular:  Negative for chest pain and palpitations.   Gastrointestinal:  Negative for constipation, diarrhea, nausea and vomiting.    Musculoskeletal:  Positive for back pain, negative for neck pain.   Neurological:  Negative for sensory change, focal weakness and headaches.   Endo/Heme/Allergies:  Does not bruise/bleed easily.   Psychiatric/Behavioral:  Positive for anxiety, Negative for depression.     ROS    OBJECTIVE:     Visit Vitals  /76   Pulse 61   Temp 97.7 °F (36.5 °C)   Resp 16   Ht 6' (1.829 m)   Wt 88 kg (194 lb)   BMI 26.31 kg/m²        Physical Exam    Body Habitus: Normal    Physical Exam:  Constitutional: The patient is well-developed and cooperative, sitting comfortably in a chair    Mental Status:   Oriented to person, place, and time  Normal speech    Motor:  Muscle bulk: normal in all extremities  Tone: normal in all extremities    Upper extremities:  Deltoid: right 5/5; left 5/5  Biceps: right 5/5; left 5/5  Triceps: right 5/5; left 5/5  Wrist extensors: right 5/5; left 5/5  Wrist flexors: right 5/5; left 5/5  : right 5/5; left 5/5  Interosseous muscles: right 5/5; left 5/5    Lower extremities:  Hip flexors: right 5/5; left 5/5  Knee extensors: right 5/5; left 5/5  Knee flexors: right 5/5; left 5/5  Foot dorsiflexors: right 5/5; left 5/5  Foot plantar flexors: right 5/5; left 5/5  Extensor hallucis longus: right 5/5; left 5/5    Sensation:  Normal to light touch x 4 extremities    Reflexes:  Dass sign: right negative; left negative  Babinski: right downgoing; left downgoing  Clonus: right negative; left negative    Musculoskeletal:    Incision: well healed midline scar    Gait: normal    Straight leg test: right negative; left positive    Upper back: No pain with palpation  Lower back: No pain with palpation    Imaging:  All pertinent neuroimaging independently reviewed. Discussed these findings in detail with the patient.    Thoracic spine x-rays, 12/11/2023- normal alignment.     Lumbar spine x-rays, 07/24/2023- normal alignment with no motion on flexion-extension views.     MRI lumbar spine without  gadolinium, 02/01/2024- when compared to a MRI lumbar spine without gadolinium that was completed on 03/14/2023, there have been no significant changes. There is normal alignment with postoperative changes s/p a left L4-5 diskectomy.  L4-5 has degenerative disc disease with Modic changes. At L3-4, there is a disc bulge with annular tear associated with mild stenosis and bilateral neuroforaminal narrowing.  Left L4-5 disc herniation with bilateral neuroforaminal narrowing.  At L5-S1, there is right-greater-than-left neuroforaminal narrowing.     ASSESSMENT:       ICD-10-CM ICD-9-CM   1. Lumbar disc disease with radiculopathy  M51.16 722.10     724.4   2. Preoperative testing  Z01.818 V72.84   3. Stopped smoking  Z87.891 V15.82       ?is a 38 y.o.?male?who has a past medical history significant for anxiety. ?The patient is s/p a left L4-5 diskectomy on 03/01/2007 by . ?He presents as a?follow up patient in the neurosurgery clinic for?progressively worsening low back pain radiating into his left leg for the last 1 1/2 years.  Mr. Romero has a normal motor and sensory neurological exam.     I reviewed pertinent imaging studies with the patient and his significant other.  A MRI lumbar spine without gadolinium completed on 02/01/2024 was reviewed.  When compared to a MRI lumbar spine without gadolinium that was completed on 03/14/2023, there have been no significant changes. There is normal alignment with postoperative changes s/p a left L4-5 diskectomy.  L4-5 has degenerative disc disease with Modic changes. At L3-4, there is a disc bulge with an annular tear associated with mild stenosis and bilateral neuroforaminal narrowing.  Left L4-5 disc herniation with bilateral neuroforaminal narrowing.  At L5-S1, there is right-greater-than-left neuroforaminal narrowing.         PLAN:       Mr. Romero is scheduled for redo lumbar sugery involving L3-4, L4-5 laminectomy and posterolateral fusion surgery  on Monday, 9/23/2024 at 0730.    Preoperative labs and imaging have been ordered    He is to discontinue taking over the counter supplements as well as all NSAIDS 10 days prior to surgery.     Recommenced to continue with routine follow ups with Dr. Rowell in [place] as needed.     Arrangements are being made for Mr. Jimenez to follow up 2 weeks postoperatively with TERA Saldana in the neurosurgery clinic on 10/08/2024 at 10:30 am     Encounter Diagnoses   Name Primary?    Lumbar disc disease with radiculopathy Yes    Preoperative testing     Stopped smoking        Orders Placed This Encounter   Procedures    X-Ray Chest PA And Lateral    Protime-INR    APTT    CBC Auto Differential    Comprehensive Metabolic Panel    Urinalysis    EKG 12-lead        Matthew Romero presents today for chronic lower back pain that radiates into bilateral lower extremities, left-greater-than-right. Pain continues to be worse with activity and prolonged sitting. Patient is concerned about pain medication after surgery and what he will go home with. States that Norco has not worked well for him in the past and gabapentin does not work for him at all. Having to stop all NSAIDs he is concerned he will continue to have increased pain prior to surgery. Encouraged to continue to take Tylenol as needed for pain and discomfort.     Discussion of suggested L3-4, L4-5 laminectomy and posterolateral fusion surgery was had and patient is agreeable to plan. His last day of tobacco use was 8/14/2024. Currently he remains tobacco free and understand that he will need to be tobacco free for at least 3 months following surgery. The risks and benefits were explained to the patient in a language he seems to understand including bleeding, infection, need for additional procedures, no change in current status, progressive decline, injury to major vessels, stroke, hemorrhage, loss of function, coma, injury to spinal nerve roots causing temporary or permanent  paralysis. No assurance was given as squeale that the patient is suffering from at this time. Once patient voiced understanding consents were signed and surgery was scheduled.      Follow up in about 4 weeks (around 10/8/2024).    The risks, benefits, and alternatives to surgery were discussed with the patient.     Complications of a Posterior Thoraco-Lumbar Fusion may include:  Failure to improve symptoms and/or increased or persistent pain; Recurrence, continuation, or worsening of the condition that required the operation; Need for further surgical intervention or treatment; Neurological injury, which may include spinal cord or nerve root injury, paralysis (which involves the inability to move arms and/or legs), clumsiness, loss of sympathetic response, loss of sensation, and loss of bowel, bladder, and sexual function; Delayed or immediate spinal instability; Failure of hardware; Misplaced screw; Pedicle fracture; Failure to fuse (increased risk with nicotine use); Theoretical risk of disease transmission from allograft material; Cerebral spinal fluid leak; Meningitis; Injury to abdominal contents- great vessels, ureters, bowel, kidneys; Damage to major blood vessels, nerves, and surrounding anatomical structures; Scarring; Blindness; and Positioning problems such as neuropathy or compartment syndrome     Complications of any surgery may include:  Adverse reaction to anesthesia; Bleeding; Transfusion of blood products, which carries a risk of infection or reaction; Infection, which requires treatment with antibiotics by mouth or intravenously, or even further surgeries; Urinary tract infection; Heart attack, stroke, pneumonia, and DVT/PE (blood clot in the legs or pelvis that can dislodge and go to the lungs); Other unforeseen complications; Coma; and Death.     Benefits of surgery include:  Possible improvement in buttocks and leg pain, numbness, and/or weakness; and possible  improvement in back pain.      Alternatives to the procedure include:  Physical therapy, chiropractic care, acupuncture, medical therapy, and pain management           POSTERIOR THORACO-LUMBAR/ LUMBAR FUSION  DISCHARGE INSTRUCTIONS        1.  Wear your TLSO Brace when sitting upright and when you are out of bed.    Your brace will likely be discontinued after 3 months.       2.   Keep your incision clean and dry.    Your sutures or staples will be removed at your first postoperative follow-up appointment in approximately 14 days.   Place clean gauze and tape over your wound daily until your sutures or staples are removed.  Wait at least 72 hours from the time of your surgery to take a shower.  After showering, pat your incision dry and replace the wound dressing.  Do not immerse your incision in water for 4-6 weeks (e.g. bath tub, hot tub, swimming pool).        3.  Activity restrictions:  No lifting greater than 15 pounds for 3 months.  No bending, stooping, or twisting.  Avoid sitting in one position for over 30 minutes at a time.  No impact exercise or contact sports for at least 3 months.  No driving for at least 2 weeks.  To resume driving short distances (<30 minutes), you must be off of your narcotic medications and be able to comfortably brake suddenly, should the need arise.    Get up and walk.        4.  Contact your Neurosurgeon if the following occurs:  Signs and symptoms of infection, including fever above 101.5 degrees Fahrenheit and/or chills.  Redness, swelling, warmth, or drainage from the incision.  Any lasting changes in sensation, numbness, and/or tingling.  Increased weakness or increased pain.  Swelling of the foot and/or lower leg with calf pain.     Neurosurgery has office hours Monday through Friday, 8:00 AM to 4:00 PM except for holidays. There is an answering service available during non-office hours, with 24 hours neurosurgery coverage.  Report to the Emergency Department if you need immediate medical assistance.        Because you have had a fusion, you are not to take steroidal medications or non-steroidal anti-inflammatory (NSAID) medications such as Motrin/ Ibuprofen or Naprosyn/ Naproxen unless agreed upon with your neurosurgeon.       Please contact Dr. Mehta's office for any questions or concerns.  Typically, your first follow-up appointment after a posterior thoraco-lumbar/ lumbar fusion surgery is approximately 14 days from the date of your operation.  At this time, sutures or staples will be removed.  For your second postoperative appointment in 4-6 weeks, an x-ray of your lumbar spine will be arranged in Radiology before your neurosurgery appointment.           This note will be sent to the patient's referring provider No ref. provider found and primary care provider Maryjo Barton MD Tracy Herpin, FNP-C  Neurosurgery      Disclaimer:  This note is prepared using voice recognition software and as such is likely to have errors despite attempts at proofreading. Please contact me for questions.

## 2024-09-08 DIAGNOSIS — M51.16 LUMBAR DISC DISEASE WITH RADICULOPATHY: Primary | ICD-10-CM

## 2024-09-09 ENCOUNTER — ANESTHESIA EVENT (OUTPATIENT)
Dept: SURGERY | Facility: HOSPITAL | Age: 39
End: 2024-09-09
Payer: COMMERCIAL

## 2024-09-10 ENCOUNTER — OFFICE VISIT (OUTPATIENT)
Dept: NEUROSURGERY | Facility: CLINIC | Age: 39
End: 2024-09-10
Payer: COMMERCIAL

## 2024-09-10 VITALS
TEMPERATURE: 98 F | BODY MASS INDEX: 26.28 KG/M2 | HEIGHT: 72 IN | DIASTOLIC BLOOD PRESSURE: 76 MMHG | RESPIRATION RATE: 16 BRPM | SYSTOLIC BLOOD PRESSURE: 130 MMHG | HEART RATE: 61 BPM | WEIGHT: 194 LBS

## 2024-09-10 DIAGNOSIS — Z87.891 STOPPED SMOKING: ICD-10-CM

## 2024-09-10 DIAGNOSIS — M51.16 LUMBAR DISC DISEASE WITH RADICULOPATHY: Primary | ICD-10-CM

## 2024-09-10 DIAGNOSIS — Z01.818 PREOPERATIVE TESTING: ICD-10-CM

## 2024-09-10 RX ORDER — BUPROPION HYDROCHLORIDE 150 MG/1
TABLET, EXTENDED RELEASE ORAL
COMMUNITY
Start: 2024-07-12 | End: 2024-09-10

## 2024-09-10 RX ORDER — IBUPROFEN 200 MG
400 TABLET ORAL EVERY 6 HOURS PRN
COMMUNITY

## 2024-09-10 RX ORDER — IBUPROFEN 200 MG
1 TABLET ORAL EVERY MORNING
COMMUNITY
Start: 2024-07-19 | End: 2024-09-10

## 2024-09-10 NOTE — PATIENT INSTRUCTIONS
No NSAIDs (Advil/Ibuprofen) after 09/12/2024    Please go to the 2nd floor of the [facility] to obtain your preoperative workup at least 7 days prior to surgical intervention.    Someone from our office will contact you the day before surgery with your arrival time for the next morning.    The evening before and morning of surgery please wash entire body with anti-bacterial soap (Dial) and surgical area with Hibiclens. Do not contact the face or genitals with Hibiclens.     Nothing to eat or drink after midnight the evening before surgery.    Your incision should remain clean, dry and cover for the first 72 hrs after surgery.      Notify the office of any fever greater than 101.5 as well as any redness, drainage, swelling or tenderness here incision.      We will have a 2 week postoperative visit to remove staples/sutures on October 8, 2024 @ 10:30 am    Wear your splint at all times and no strenuous activities with the operative hand for 6 weeks.     No lifting greater than 15 lb for 4-6 weeks following surgery.    No driving for 1 week following surgery or while requiring narcotic medication.        POSTERIOR THORACO-LUMBAR/ LUMBAR FUSION  DISCHARGE INSTRUCTIONS        1.  Wear your TLSO Brace when sitting upright and when you are out of bed.    Your brace will likely be discontinued after 3 months.       2.   Keep your incision clean and dry.    Your sutures or staples will be removed at your first postoperative follow-up appointment in approximately 14 days.   Place clean gauze and tape over your wound daily until your sutures or staples are removed.  Wait at least 72 hours from the time of your surgery to take a shower.  After showering, pat your incision dry and replace the wound dressing.  Do not immerse your incision in water for 4-6 weeks (e.g. bath tub, hot tub, swimming pool).        3.  Activity restrictions:  No lifting greater than 15 pounds for 3 months.  No bending, stooping, or  Scheduled procedure with Patient at  [phone] (home)   Scheduled Via: Open Case for [facility], Case #[id number]  Patient prefers n/a facility rather than the doctor's preferred facility  Procedure date: 9.11.19  Procedure time: nurse to assign  Olga Lidia explained: Yes  Rep Contacted?: No  Entered into MD's [place]? No  Insurance confirmed as [employer] Medicare, will be the same at time of procedure?: Yes  Insurance Accepted at Facility? Yes    The following have been confirmed:  Latex Allergy No  Diabetic No  Sleep Apnea No  Diuretic/Water pill No  Defibrillator/Pacemaker No  MRSA hx No  Blood thinners: Coumadin (Warfarin) or Plavix No      Aspirin No      Phentermine (diet pill) No  Pre-Op testing required Yes, Patient informed Yes  Prep required? No; Briefly reviewed? n/a; Prep cost range discussed? n/a  If procedure is scheduled 7 days or less, patient was told to  prep letter?: n/a   twisting.  Avoid sitting in one position for over 30 minutes at a time.  No impact exercise or contact sports for at least 3 months.  No driving for at least 2 weeks.  To resume driving short distances (<30 minutes), you must be off of your narcotic medications and be able to comfortably brake suddenly, should the need arise.    Get up and walk.        4.  Contact your Neurosurgeon if the following occurs:  Signs and symptoms of infection, including fever above 101.5 degrees Fahrenheit and/or chills.  Redness, swelling, warmth, or drainage from the incision.  Any lasting changes in sensation, numbness, and/or tingling.  Increased weakness or increased pain.  Swelling of the foot and/or lower leg with calf pain.     Neurosurgery has office hours Monday through Friday, 8:00 AM to 4:00 PM except for holidays. There is an answering service available during non-office hours, with 24 hours neurosurgery coverage.  Report to the Emergency Department if you need immediate medical assistance.       Because you have had a fusion, you are not to take steroidal medications or non-steroidal anti-inflammatory (NSAID) medications such as Motrin/ Ibuprofen or Naprosyn/ Naproxen unless agreed upon with your neurosurgeon.       Please contact Dr. Mehta's office for any questions or concerns.  Typically, your first follow-up appointment after a posterior thoraco-lumbar/ lumbar fusion surgery is approximately 14 days from the date of your operation.  At this time, sutures or staples will be removed.  For your second postoperative appointment in 4-6 weeks, an x-ray of your lumbar spine will be arranged in Radiology before your neurosurgery appointment.

## 2024-09-13 ENCOUNTER — HOSPITAL ENCOUNTER (OUTPATIENT)
Dept: RADIOLOGY | Facility: HOSPITAL | Age: 39
Discharge: HOME | End: 2024-09-13
Payer: COMMERCIAL

## 2024-09-13 DIAGNOSIS — M51.16 LUMBAR DISC DISEASE WITH RADICULOPATHY: ICD-10-CM

## 2024-09-13 DIAGNOSIS — Z01.818 PREOPERATIVE TESTING: ICD-10-CM

## 2024-09-13 PROCEDURE — 71046 X-RAY EXAM CHEST 2 VIEWS: CPT | Mod: TC

## 2024-09-16 ENCOUNTER — TELEPHONE (OUTPATIENT)
Dept: NEUROSURGERY | Facility: HOSPITAL | Age: 39
End: 2024-09-16
Payer: COMMERCIAL

## 2024-09-16 NOTE — TELEPHONE ENCOUNTER
I am requesting Malinda to contact Mr. Romero. I reviewed the patient's preoperative lab and imaging results that were completed on 09/13/2024. All of these values are acceptable for proceeding with surgery.     Mr. Romero has been instructed to hold his ibuprofen and all other OTC supplements and NSAIDs for 10 days prior to surgery.     Ms. Romero is scheduled to proceed with a redo lumbar surgery involving L3-4, L4-5 laminectomy and posterolateral fusion on Monday 09/23/2024 at 0730 am.

## 2024-09-20 DIAGNOSIS — M47.816 LUMBAR SPONDYLOSIS: Primary | ICD-10-CM

## 2024-09-20 NOTE — PRE-PROCEDURE INSTRUCTIONS
"[facility]: Outpatient Surgery  Preprocedure Instructions    Expectations: "Because of inconsistent procedure completion times, an unexpected wait may occur. The physicians would like you to be here to prepare in the event they run ahead of time. We will make you as comfortable as possible and keep you informed. We apologize in advance if this happens."     Your arrival time for your surgery or procedure is __9 am____.  We ask patients to arrive about 2 hours before surgery to allow for enough time to review your health history & medications, start your IV, complete any outstanding labwork or tests, and meet your Anesthesiologist.    We are located at [facility], [address] [place] [address].    Enter through the [place] entrance next to the Emergency Room, and come to the 6th floor to the Outpatient Surgery Department.    If you are in need of a wheelchair the morning of surgery please call [phone] about 15 minutes before you arrive. Parking is available in our parking garage located off [facility], between the hospital and [facility].      Visitory Policy:  You are allowed 2 adult visitors to be with you in the hospital. Please, no switching visitors in pre-op area. All hospital visitors should be in good current health.  No small children.  We will update you and your family hourly on the progression of surgery and any unexpected delays.      What to Bring:  Please have your ID, insurance cards, and all home medication bottles with you at check in.  Bring your CPAP machine if one is used at home.     Fasting:  Nothing to eat or drink after midnight the night before your procedure. This includes no ice, gum, hard candies, and/or tobacco products.    Medications:  Follow your doctor's instructions for taking any medications on the morning of your procedure.  If no instructions for taking medications were given, do not take any medications but bring your " medications in their bottles to your procedure check in.     Follow your doctor's preoperative instructions regarding skin prep, bowel prep, bathing, or showering prior to your procedure.  If any special soaps were provided to you, please use according to your doctor's instructions. If no instructions were given from your doctor, take a good bath or shower with antibacterial soap the night before and the morning of your procedure.  On the morning of procedure, wear loose, comfortable clothing.  No lotions, makeup, perfumes, colognes, deodorant, or jewelry to your procedure.  Removable items (glasses, contact lenses, dentures, retainers, hearing aids) need to be removed for your procedure.  Bring your storage containers for these items if you wear them.     Artificial nails, body jewelry, eyelash extensions, and/or hair extensions with metal clips are not allowed during your surgery.  If you currently wear any of these items, please arrange for them to be removed prior to your arrival to the hospital.     Outpatient or Same Day Surgeries:  Any patients receiving sedation/anesthesia are advised not to drive for 24 hours after their procedure.  We do not allow patients to drive themselves home after discharge.  If you are going home after your procedure, please have someone available to drive you home from the hospital.     You may call the Outpatient Surgery Department at [phone] with any questions or concerns.  We are looking forward to meeting you and taking great care of you for your procedure.  Thank you for choosing [facility] for your surgical needs.

## 2024-09-20 NOTE — TELEPHONE ENCOUNTER
Spoke with the patient regarding results.  He is aware of arrival time and instructions for morning of surgery.

## 2024-09-23 ENCOUNTER — HOSPITAL ENCOUNTER (INPATIENT)
Facility: HOSPITAL | Age: 39
LOS: 4 days | Discharge: HOME | DRG: 460 | End: 2024-09-27
Attending: NEUROLOGICAL SURGERY | Admitting: NEUROLOGICAL SURGERY
Payer: COMMERCIAL

## 2024-09-23 ENCOUNTER — ANESTHESIA (OUTPATIENT)
Dept: SURGERY | Facility: HOSPITAL | Age: 39
End: 2024-09-23
Payer: COMMERCIAL

## 2024-09-23 DIAGNOSIS — M47.816 LUMBAR SPONDYLOSIS: ICD-10-CM

## 2024-09-23 DIAGNOSIS — G89.29 CHRONIC BILATERAL LOW BACK PAIN WITH BILATERAL SCIATICA: Primary | ICD-10-CM

## 2024-09-23 DIAGNOSIS — M54.42 CHRONIC BILATERAL LOW BACK PAIN WITH BILATERAL SCIATICA: Primary | ICD-10-CM

## 2024-09-23 DIAGNOSIS — M54.41 CHRONIC BILATERAL LOW BACK PAIN WITH BILATERAL SCIATICA: Primary | ICD-10-CM

## 2024-09-23 LAB
GROUP & RH: NORMAL
INDIRECT COOMBS: NORMAL
SPECIMEN OUTDATE: NORMAL

## 2024-09-23 PROCEDURE — 63600175 PHARM REV CODE 636 W HCPCS

## 2024-09-23 PROCEDURE — 25000003 PHARM REV CODE 250: Performed by: NURSE ANESTHETIST, CERTIFIED REGISTERED

## 2024-09-23 PROCEDURE — 27201423 OPTIME MED/SURG SUP & DEVICES STERILE SUPPLY: Performed by: NEUROLOGICAL SURGERY

## 2024-09-23 PROCEDURE — 8E0WXBZ COMPUTER ASSISTED PROCEDURE OF TRUNK REGION: ICD-10-PCS | Performed by: NEUROLOGICAL SURGERY

## 2024-09-23 PROCEDURE — 22612 ARTHRD PST TQ 1NTRSPC LUMBAR: CPT | Mod: AS,,,

## 2024-09-23 PROCEDURE — 22614 ARTHRD PST TQ 1NTRSPC EA ADD: CPT | Mod: ,,, | Performed by: NEUROLOGICAL SURGERY

## 2024-09-23 PROCEDURE — 86900 BLOOD TYPING SEROLOGIC ABO: CPT | Performed by: NEUROLOGICAL SURGERY

## 2024-09-23 PROCEDURE — 61783 SCAN PROC SPINAL: CPT | Mod: 59,,, | Performed by: NEUROLOGICAL SURGERY

## 2024-09-23 PROCEDURE — 27800903 OPTIME MED/SURG SUP & DEVICES OTHER IMPLANTS: Performed by: NEUROLOGICAL SURGERY

## 2024-09-23 PROCEDURE — D9220A PRA ANESTHESIA: Mod: CRNA,,, | Performed by: NURSE ANESTHETIST, CERTIFIED REGISTERED

## 2024-09-23 PROCEDURE — 37000008 HC ANESTHESIA 1ST 15 MINUTES: Performed by: NEUROLOGICAL SURGERY

## 2024-09-23 PROCEDURE — 20936 SP BONE AGRFT LOCAL ADD-ON: CPT | Mod: ,,, | Performed by: NEUROLOGICAL SURGERY

## 2024-09-23 PROCEDURE — 22614 ARTHRD PST TQ 1NTRSPC EA ADD: CPT | Mod: AS,,,

## 2024-09-23 PROCEDURE — 36415 COLL VENOUS BLD VENIPUNCTURE: CPT | Performed by: NEUROLOGICAL SURGERY

## 2024-09-23 PROCEDURE — 37000009 HC ANESTHESIA EA ADD 15 MINS: Performed by: NEUROLOGICAL SURGERY

## 2024-09-23 PROCEDURE — 63048 LAM FACETEC &FORAMOT EA ADDL: CPT | Mod: ,,, | Performed by: NEUROLOGICAL SURGERY

## 2024-09-23 PROCEDURE — 63047 LAM FACETEC & FORAMOT LUMBAR: CPT | Mod: AS,51,,

## 2024-09-23 PROCEDURE — D9220A PRA ANESTHESIA: Mod: ANES,,, | Performed by: ANESTHESIOLOGY

## 2024-09-23 PROCEDURE — 63600175 PHARM REV CODE 636 W HCPCS: Performed by: NEUROLOGICAL SURGERY

## 2024-09-23 PROCEDURE — 25000003 PHARM REV CODE 250: Performed by: ANESTHESIOLOGY

## 2024-09-23 PROCEDURE — 0SG1071 FUSION OF 2 OR MORE LUMBAR VERTEBRAL JOINTS WITH AUTOLOGOUS TISSUE SUBSTITUTE, POSTERIOR APPROACH, POSTERIOR COLUMN, OPEN APPROACH: ICD-10-PCS | Performed by: NEUROLOGICAL SURGERY

## 2024-09-23 PROCEDURE — 71000033 HC RECOVERY, INTIAL HOUR: Performed by: NEUROLOGICAL SURGERY

## 2024-09-23 PROCEDURE — 00NY0ZZ RELEASE LUMBAR SPINAL CORD, OPEN APPROACH: ICD-10-PCS | Performed by: NEUROLOGICAL SURGERY

## 2024-09-23 PROCEDURE — 25000003 PHARM REV CODE 250

## 2024-09-23 PROCEDURE — 36000713 HC OR TIME LEV V EA ADD 15 MIN: Performed by: NEUROLOGICAL SURGERY

## 2024-09-23 PROCEDURE — 11000001 HC ACUTE MED/SURG PRIVATE ROOM

## 2024-09-23 PROCEDURE — 20930 SP BONE ALGRFT MORSEL ADD-ON: CPT | Mod: ,,, | Performed by: NEUROLOGICAL SURGERY

## 2024-09-23 PROCEDURE — 86901 BLOOD TYPING SEROLOGIC RH(D): CPT | Performed by: NEUROLOGICAL SURGERY

## 2024-09-23 PROCEDURE — 63600175 PHARM REV CODE 636 W HCPCS: Performed by: NURSE ANESTHETIST, CERTIFIED REGISTERED

## 2024-09-23 PROCEDURE — 25000003 PHARM REV CODE 250: Performed by: NEUROLOGICAL SURGERY

## 2024-09-23 PROCEDURE — C1713 ANCHOR/SCREW BN/BN,TIS/BN: HCPCS | Performed by: NEUROLOGICAL SURGERY

## 2024-09-23 PROCEDURE — 63048 LAM FACETEC &FORAMOT EA ADDL: CPT | Mod: AS,,,

## 2024-09-23 PROCEDURE — 01NB0ZZ RELEASE LUMBAR NERVE, OPEN APPROACH: ICD-10-PCS | Performed by: NEUROLOGICAL SURGERY

## 2024-09-23 PROCEDURE — 22842 INSERT SPINE FIXATION DEVICE: CPT | Mod: ,,, | Performed by: NEUROLOGICAL SURGERY

## 2024-09-23 PROCEDURE — 22612 ARTHRD PST TQ 1NTRSPC LUMBAR: CPT | Mod: ,,, | Performed by: NEUROLOGICAL SURGERY

## 2024-09-23 PROCEDURE — 36000712 HC OR TIME LEV V 1ST 15 MIN: Performed by: NEUROLOGICAL SURGERY

## 2024-09-23 PROCEDURE — 22842 INSERT SPINE FIXATION DEVICE: CPT | Mod: AS,,,

## 2024-09-23 PROCEDURE — 86850 RBC ANTIBODY SCREEN: CPT | Performed by: NEUROLOGICAL SURGERY

## 2024-09-23 PROCEDURE — 63047 LAM FACETEC & FORAMOT LUMBAR: CPT | Mod: 51,,, | Performed by: NEUROLOGICAL SURGERY

## 2024-09-23 DEVICE — SCREW HORIZON SOLERA 6.5X40: Type: IMPLANTABLE DEVICE | Site: SPINE LUMBAR | Status: FUNCTIONAL

## 2024-09-23 DEVICE — ROD HORIZON CURV 5.5CCM 70MM: Type: IMPLANTABLE DEVICE | Site: SPINE LUMBAR | Status: FUNCTIONAL

## 2024-09-23 DEVICE — DBM T42275 8MMX1CMX10CM 2 EACH GRAFTON M
Type: IMPLANTABLE DEVICE | Site: SPINE LUMBAR | Status: FUNCTIONAL
Brand: GRAFTON®AND GRAFTON PLUS®DEMINERALIZED BONE MATRIX (DBM)

## 2024-09-23 DEVICE — GRAFT CHIPS FD 4-10MM 15CC: Type: IMPLANTABLE DEVICE | Site: SPINE LUMBAR | Status: FUNCTIONAL

## 2024-09-23 DEVICE — SET SCREW HORIZON SOLERA 5.5-6: Type: IMPLANTABLE DEVICE | Site: SPINE LUMBAR | Status: FUNCTIONAL

## 2024-09-23 DEVICE — IMPLANTABLE DEVICE: Type: IMPLANTABLE DEVICE | Site: SPINE LUMBAR | Status: FUNCTIONAL

## 2024-09-23 DEVICE — SCREW HORIZON SOLERA 6.5X45MM: Type: IMPLANTABLE DEVICE | Site: SPINE LUMBAR | Status: FUNCTIONAL

## 2024-09-23 DEVICE — SCREW HORIZON SOLERA 5.5X45MM: Type: IMPLANTABLE DEVICE | Site: SPINE LUMBAR | Status: FUNCTIONAL

## 2024-09-23 RX ORDER — ACETAMINOPHEN 500 MG
1000 TABLET ORAL
Status: DISCONTINUED | OUTPATIENT
Start: 2024-09-23 | End: 2024-09-23

## 2024-09-23 RX ORDER — GLYCOPYRROLATE 0.2 MG/ML
INJECTION INTRAMUSCULAR; INTRAVENOUS
Status: DISCONTINUED | OUTPATIENT
Start: 2024-09-23 | End: 2024-09-23

## 2024-09-23 RX ORDER — LIDOCAINE HYDROCHLORIDE 20 MG/ML
INJECTION INTRAVENOUS
Status: DISCONTINUED | OUTPATIENT
Start: 2024-09-23 | End: 2024-09-23

## 2024-09-23 RX ORDER — METHOCARBAMOL 750 MG/1
750 TABLET, FILM COATED ORAL 3 TIMES DAILY PRN
Status: DISCONTINUED | OUTPATIENT
Start: 2024-09-23 | End: 2024-09-25

## 2024-09-23 RX ORDER — METHOCARBAMOL 100 MG/ML
1000 INJECTION, SOLUTION INTRAMUSCULAR; INTRAVENOUS ONCE
Status: COMPLETED | OUTPATIENT
Start: 2024-09-23 | End: 2024-09-23

## 2024-09-23 RX ORDER — HALOPERIDOL 5 MG/ML
0.5 INJECTION INTRAMUSCULAR EVERY 10 MIN PRN
Status: DISCONTINUED | OUTPATIENT
Start: 2024-09-23 | End: 2024-09-23 | Stop reason: HOSPADM

## 2024-09-23 RX ORDER — MIDAZOLAM HYDROCHLORIDE 2 MG/2ML
2 INJECTION, SOLUTION INTRAMUSCULAR; INTRAVENOUS
Status: DISCONTINUED | OUTPATIENT
Start: 2024-09-23 | End: 2024-09-23

## 2024-09-23 RX ORDER — CEFAZOLIN SODIUM 1 G/3ML
INJECTION, POWDER, FOR SOLUTION INTRAMUSCULAR; INTRAVENOUS
Status: DISCONTINUED | OUTPATIENT
Start: 2024-09-23 | End: 2024-09-23

## 2024-09-23 RX ORDER — SODIUM CHLORIDE 9 MG/ML
INJECTION, SOLUTION INTRAVENOUS CONTINUOUS
Status: DISCONTINUED | OUTPATIENT
Start: 2024-09-23 | End: 2024-09-25

## 2024-09-23 RX ORDER — ONDANSETRON HYDROCHLORIDE 2 MG/ML
INJECTION, SOLUTION INTRAVENOUS
Status: DISCONTINUED | OUTPATIENT
Start: 2024-09-23 | End: 2024-09-23

## 2024-09-23 RX ORDER — KETOROLAC TROMETHAMINE 30 MG/ML
INJECTION, SOLUTION INTRAMUSCULAR; INTRAVENOUS
Status: DISCONTINUED | OUTPATIENT
Start: 2024-09-23 | End: 2024-09-23

## 2024-09-23 RX ORDER — SODIUM CHLORIDE, SODIUM GLUCONATE, SODIUM ACETATE, POTASSIUM CHLORIDE AND MAGNESIUM CHLORIDE 30; 37; 368; 526; 502 MG/100ML; MG/100ML; MG/100ML; MG/100ML; MG/100ML
INJECTION, SOLUTION INTRAVENOUS CONTINUOUS
Status: DISCONTINUED | OUTPATIENT
Start: 2024-09-23 | End: 2024-09-23

## 2024-09-23 RX ORDER — GABAPENTIN 300 MG/1
300 CAPSULE ORAL
Status: DISCONTINUED | OUTPATIENT
Start: 2024-09-23 | End: 2024-09-23

## 2024-09-23 RX ORDER — MEPERIDINE HYDROCHLORIDE 25 MG/ML
6.25 INJECTION INTRAMUSCULAR; INTRAVENOUS; SUBCUTANEOUS ONCE AS NEEDED
Status: DISCONTINUED | OUTPATIENT
Start: 2024-09-23 | End: 2024-09-23 | Stop reason: HOSPADM

## 2024-09-23 RX ORDER — FENTANYL CITRATE 50 UG/ML
INJECTION, SOLUTION INTRAMUSCULAR; INTRAVENOUS
Status: DISCONTINUED | OUTPATIENT
Start: 2024-09-23 | End: 2024-09-23

## 2024-09-23 RX ORDER — HYDROMORPHONE HYDROCHLORIDE 2 MG/ML
INJECTION, SOLUTION INTRAMUSCULAR; INTRAVENOUS; SUBCUTANEOUS
Status: DISCONTINUED | OUTPATIENT
Start: 2024-09-23 | End: 2024-09-23

## 2024-09-23 RX ORDER — ALUMINUM HYDROXIDE, MAGNESIUM HYDROXIDE, AND SIMETHICONE 1200; 120; 1200 MG/30ML; MG/30ML; MG/30ML
30 SUSPENSION ORAL EVERY 4 HOURS PRN
Status: DISCONTINUED | OUTPATIENT
Start: 2024-09-23 | End: 2024-09-27 | Stop reason: HOSPADM

## 2024-09-23 RX ORDER — ONDANSETRON 4 MG/1
8 TABLET, ORALLY DISINTEGRATING ORAL EVERY 6 HOURS PRN
Status: DISCONTINUED | OUTPATIENT
Start: 2024-09-23 | End: 2024-09-27 | Stop reason: HOSPADM

## 2024-09-23 RX ORDER — ONDANSETRON 4 MG/1
4 TABLET, ORALLY DISINTEGRATING ORAL
Status: COMPLETED | OUTPATIENT
Start: 2024-09-23 | End: 2024-09-23

## 2024-09-23 RX ORDER — HYDROMORPHONE HYDROCHLORIDE 2 MG/ML
0.4 INJECTION, SOLUTION INTRAMUSCULAR; INTRAVENOUS; SUBCUTANEOUS EVERY 5 MIN PRN
Status: DISCONTINUED | OUTPATIENT
Start: 2024-09-23 | End: 2024-09-23 | Stop reason: HOSPADM

## 2024-09-23 RX ORDER — PROPOFOL 10 MG/ML
VIAL (ML) INTRAVENOUS
Status: DISCONTINUED | OUTPATIENT
Start: 2024-09-23 | End: 2024-09-23

## 2024-09-23 RX ORDER — DOCUSATE SODIUM 100 MG/1
100 CAPSULE, LIQUID FILLED ORAL 2 TIMES DAILY
Status: DISCONTINUED | OUTPATIENT
Start: 2024-09-23 | End: 2024-09-27 | Stop reason: HOSPADM

## 2024-09-23 RX ORDER — MORPHINE SULFATE 4 MG/ML
1 INJECTION, SOLUTION INTRAMUSCULAR; INTRAVENOUS EVERY 4 HOURS PRN
Status: DISCONTINUED | OUTPATIENT
Start: 2024-09-23 | End: 2024-09-25

## 2024-09-23 RX ORDER — HYDROCODONE BITARTRATE AND ACETAMINOPHEN 5; 325 MG/1; MG/1
1 TABLET ORAL
OUTPATIENT
Start: 2024-09-23

## 2024-09-23 RX ORDER — DEXAMETHASONE SODIUM PHOSPHATE 4 MG/ML
INJECTION, SOLUTION INTRA-ARTICULAR; INTRALESIONAL; INTRAMUSCULAR; INTRAVENOUS; SOFT TISSUE
Status: DISCONTINUED | OUTPATIENT
Start: 2024-09-23 | End: 2024-09-23

## 2024-09-23 RX ORDER — CEFAZOLIN SODIUM 2 G/50ML
2 SOLUTION INTRAVENOUS
Status: DISCONTINUED | OUTPATIENT
Start: 2024-09-23 | End: 2024-09-23

## 2024-09-23 RX ORDER — PROCHLORPERAZINE EDISYLATE 5 MG/ML
5 INJECTION INTRAMUSCULAR; INTRAVENOUS EVERY 30 MIN PRN
Status: DISCONTINUED | OUTPATIENT
Start: 2024-09-23 | End: 2024-09-23 | Stop reason: HOSPADM

## 2024-09-23 RX ORDER — SUCCINYLCHOLINE CHLORIDE 20 MG/ML
INJECTION INTRAMUSCULAR; INTRAVENOUS
Status: DISCONTINUED | OUTPATIENT
Start: 2024-09-23 | End: 2024-09-23

## 2024-09-23 RX ORDER — HYDROCODONE BITARTRATE AND ACETAMINOPHEN 10; 325 MG/1; MG/1
1 TABLET ORAL EVERY 6 HOURS PRN
Status: DISCONTINUED | OUTPATIENT
Start: 2024-09-23 | End: 2024-09-24

## 2024-09-23 RX ORDER — ROCURONIUM BROMIDE 10 MG/ML
INJECTION, SOLUTION INTRAVENOUS
Status: DISCONTINUED | OUTPATIENT
Start: 2024-09-23 | End: 2024-09-23

## 2024-09-23 RX ORDER — MUPIROCIN 20 MG/G
OINTMENT TOPICAL 2 TIMES DAILY
Status: COMPLETED | OUTPATIENT
Start: 2024-09-23 | End: 2024-09-25

## 2024-09-23 RX ORDER — CEFAZOLIN SODIUM 2 G/50ML
2 SOLUTION INTRAVENOUS
Status: DISCONTINUED | OUTPATIENT
Start: 2024-09-23 | End: 2024-09-27

## 2024-09-23 RX ORDER — LIDOCAINE HYDROCHLORIDE AND EPINEPHRINE 5; 5 MG/ML; UG/ML
INJECTION, SOLUTION INFILTRATION; PERINEURAL
Status: DISCONTINUED | OUTPATIENT
Start: 2024-09-23 | End: 2024-09-23 | Stop reason: HOSPADM

## 2024-09-23 RX ORDER — AMOXICILLIN 250 MG
2 CAPSULE ORAL NIGHTLY PRN
Status: DISCONTINUED | OUTPATIENT
Start: 2024-09-23 | End: 2024-09-27 | Stop reason: HOSPADM

## 2024-09-23 RX ORDER — ACETAMINOPHEN 10 MG/ML
INJECTION, SOLUTION INTRAVENOUS
Status: DISCONTINUED | OUTPATIENT
Start: 2024-09-23 | End: 2024-09-23

## 2024-09-23 RX ORDER — PROCHLORPERAZINE EDISYLATE 5 MG/ML
5 INJECTION INTRAMUSCULAR; INTRAVENOUS EVERY 6 HOURS PRN
Status: DISCONTINUED | OUTPATIENT
Start: 2024-09-23 | End: 2024-09-27 | Stop reason: HOSPADM

## 2024-09-23 RX ORDER — BUPIVACAINE HYDROCHLORIDE AND EPINEPHRINE 5; 5 MG/ML; UG/ML
INJECTION, SOLUTION EPIDURAL; INTRACAUDAL; PERINEURAL
Status: DISCONTINUED | OUTPATIENT
Start: 2024-09-23 | End: 2024-09-23 | Stop reason: HOSPADM

## 2024-09-23 RX ORDER — MIDAZOLAM HYDROCHLORIDE 1 MG/ML
INJECTION INTRAMUSCULAR; INTRAVENOUS
Status: DISCONTINUED | OUTPATIENT
Start: 2024-09-23 | End: 2024-09-23

## 2024-09-23 RX ORDER — CELECOXIB 200 MG/1
200 CAPSULE ORAL
Status: DISCONTINUED | OUTPATIENT
Start: 2024-09-23 | End: 2024-09-23

## 2024-09-23 RX ADMIN — ACETAMINOPHEN 1000 MG: 500 TABLET ORAL at 09:09

## 2024-09-23 RX ADMIN — PROPOFOL 150 MG: 10 INJECTION, EMULSION INTRAVENOUS at 12:09

## 2024-09-23 RX ADMIN — HYDROMORPHONE HYDROCHLORIDE 1 MG: 2 INJECTION, SOLUTION INTRAMUSCULAR; INTRAVENOUS; SUBCUTANEOUS at 08:09

## 2024-09-23 RX ADMIN — METHOCARBAMOL 1000 MG: 100 INJECTION INTRAMUSCULAR; INTRAVENOUS at 09:09

## 2024-09-23 RX ADMIN — DEXAMETHASONE SODIUM PHOSPHATE 4 MG: 4 INJECTION, SOLUTION INTRA-ARTICULAR; INTRALESIONAL; INTRAMUSCULAR; INTRAVENOUS; SOFT TISSUE at 01:09

## 2024-09-23 RX ADMIN — LIDOCAINE HYDROCHLORIDE 50 MG: 20 INJECTION INTRAVENOUS at 12:09

## 2024-09-23 RX ADMIN — GLYCOPYRROLATE 0.2 MG: 0.2 INJECTION INTRAMUSCULAR; INTRAVENOUS at 01:09

## 2024-09-23 RX ADMIN — SODIUM CHLORIDE, SODIUM GLUCONATE, SODIUM ACETATE, POTASSIUM CHLORIDE AND MAGNESIUM CHLORIDE: 526; 502; 368; 37; 30 INJECTION, SOLUTION INTRAVENOUS at 05:09

## 2024-09-23 RX ADMIN — HYDROMORPHONE HYDROCHLORIDE 1 MG: 2 INJECTION, SOLUTION INTRAMUSCULAR; INTRAVENOUS; SUBCUTANEOUS at 07:09

## 2024-09-23 RX ADMIN — SODIUM CHLORIDE: 9 INJECTION, SOLUTION INTRAVENOUS at 09:09

## 2024-09-23 RX ADMIN — SODIUM CHLORIDE, SODIUM GLUCONATE, SODIUM ACETATE, POTASSIUM CHLORIDE AND MAGNESIUM CHLORIDE: 526; 502; 368; 37; 30 INJECTION, SOLUTION INTRAVENOUS at 02:09

## 2024-09-23 RX ADMIN — CEFAZOLIN SODIUM 2 G: 2 SOLUTION INTRAVENOUS at 10:09

## 2024-09-23 RX ADMIN — CEFAZOLIN 1 G: 330 INJECTION, POWDER, FOR SOLUTION INTRAMUSCULAR; INTRAVENOUS at 05:09

## 2024-09-23 RX ADMIN — SODIUM CHLORIDE, SODIUM GLUCONATE, SODIUM ACETATE, POTASSIUM CHLORIDE AND MAGNESIUM CHLORIDE: 526; 502; 368; 37; 30 INJECTION, SOLUTION INTRAVENOUS at 12:09

## 2024-09-23 RX ADMIN — CELECOXIB 200 MG: 200 CAPSULE ORAL at 09:09

## 2024-09-23 RX ADMIN — PHENYLEPHRINE HYDROCHLORIDE 1 MCG/KG/MIN: 10 INJECTION INTRAVENOUS at 01:09

## 2024-09-23 RX ADMIN — KETOROLAC TROMETHAMINE 30 MG: 30 INJECTION, SOLUTION INTRAMUSCULAR; INTRAVENOUS at 07:09

## 2024-09-23 RX ADMIN — REMIFENTANIL HYDROCHLORIDE 0.2 MCG/KG/MIN: 1 INJECTION, POWDER, LYOPHILIZED, FOR SOLUTION INTRAVENOUS at 01:09

## 2024-09-23 RX ADMIN — HYDROCODONE BITARTRATE AND ACETAMINOPHEN 1 TABLET: 10; 325 TABLET ORAL at 10:09

## 2024-09-23 RX ADMIN — ACETAMINOPHEN 1000 MG: 10 INJECTION, SOLUTION INTRAVENOUS at 07:09

## 2024-09-23 RX ADMIN — ONDANSETRON 4 MG: 4 TABLET, ORALLY DISINTEGRATING ORAL at 09:09

## 2024-09-23 RX ADMIN — CEFAZOLIN 2 G: 330 INJECTION, POWDER, FOR SOLUTION INTRAMUSCULAR; INTRAVENOUS at 01:09

## 2024-09-23 RX ADMIN — SUCCINYLCHOLINE CHLORIDE 120 MG: 20 INJECTION, SOLUTION INTRAMUSCULAR; INTRAVENOUS at 12:09

## 2024-09-23 RX ADMIN — ROCURONIUM BROMIDE 5 MG: 10 SOLUTION INTRAVENOUS at 12:09

## 2024-09-23 RX ADMIN — MUPIROCIN: 20 OINTMENT TOPICAL at 09:09

## 2024-09-23 RX ADMIN — ONDANSETRON 4 MG: 2 INJECTION INTRAMUSCULAR; INTRAVENOUS at 08:09

## 2024-09-23 RX ADMIN — MIDAZOLAM HYDROCHLORIDE 2 MG: 1 INJECTION, SOLUTION INTRAMUSCULAR; INTRAVENOUS at 12:09

## 2024-09-23 RX ADMIN — GABAPENTIN 300 MG: 300 CAPSULE ORAL at 09:09

## 2024-09-23 RX ADMIN — FENTANYL CITRATE 100 MCG: 50 INJECTION, SOLUTION INTRAMUSCULAR; INTRAVENOUS at 12:09

## 2024-09-23 NOTE — ANESTHESIA PREPROCEDURE EVALUATION
"                                                                                                             09/23/2024  Matthew Romero is a 39 y.o., male with   -------------------------------------    Anxiety    Arthritis    Cervicalgia    Chronic bilateral low back pain with bilateral sciatica    Lumbar disc disease with radiculopathy    Muscle spasm of back    Myalgia, unspecified site    Numbness and tingling of left leg    Pain    Radiculopathy, lumbosacral region    Sciatica, left side    Segmental and somatic dysfunction of cervical region    Segmental and somatic dysfunction of lumbar region    Segmental and somatic dysfunction of pelvic region    Segmental and somatic dysfunction of sacral region    Segmental and somatic dysfunction of thoracic region    Stiffness of left hip, not elsewhere classified    Tobacco dependence       And   ----------------------------    Back surgery    microdiscectomy L4-5    Hernia repair    [place] tooth extraction       Presents for PLIF L3-4, L4-5 lami and posterior lateral fusion NTI/TIVA- previous lumbar discectomy in 2007   Pt works for [employer] - his signficant other and parent at bedside  He is completely healthy - takes no meds - no previous surgeries  Had severe headache that began on Friday which is what prompted him to come to the ER  No other focal symptoms        "  Matthew Romero is a 39 y.o. male who presents for neurosurgical evaluation with his significant other. The patient presents today describing lower back pain that radiates into left lower anterior thigh, left lateral knee and left lateral and posterior calf. Occasional radiation of symptoms following the same dermatone present in the right depending on activity     Pre-op Assessment    I have reviewed the NPO Status.      Review of Systems  Social:    Tobacco Use: , for quit on 08/14/24 due to this upcoming surgery pack-years    Neurological:    Neuromuscular Disease,                     "             Neuromuscular Disease       Physical Exam  General: Well nourished, Cooperative, Alert and Oriented    Airway:  Mallampati: II   Mouth Opening: Normal  TM Distance: Normal  Tongue: Normal  Neck ROM: Normal ROM    Dental:  Intact    Chest/Lungs:  Clear to auscultation, Normal Respiratory Rate    Heart:  Rate: Normal  Rhythm: Regular Rhythm       Latest Reference Range & Units 09/13/24 06:20   WBC 4.50 - 11.50 x10(3)/mcL 5.42   Hemoglobin 14.0 - 18.0 g/dL 15.7   Hematocrit 42.0 - 52.0 % 45.8   Platelet Count 130 - 400 x10(3)/mcL 191   PT 12.5 - 14.5 seconds 13.0   INR <=1.3  1.0   PTT 23.2 - 33.7 seconds 27.4   Sodium 136 - 145 mmol/L 142   Potassium 3.5 - 5.1 mmol/L 4.8   Chloride 98 - 107 mmol/L 104   CO2 22 - 29 mmol/L 33 (H)   Anion Gap mEq/L 5.0   BUN 8.9 - 20.6 mg/dL 14.6   Creatinine 0.73 - 1.18 mg/dL 1.03   BUN/CREAT RATIO  14   eGFR mL/min/1.73/m2 >60   Glucose 74 - 100 mg/dL 99   (H): Data is abnormally high    Anesthesia Plan  Type of Anesthesia, risks & benefits discussed:    Anesthesia Type: Gen ETT  Intra-op Monitoring Plan: Standard ASA Monitors  Post Op Pain Control Plan: multimodal analgesia and IV/PO Opioids PRN  Induction:  IV  Airway Plan: Direct, Post-Induction  Informed Consent: Informed consent signed with the Patient and all parties understand the risks and agree with anesthesia plan.  All questions answered.   ASA Score: 3  Day of Surgery Review of History & Physical: H&P Update referred to the surgeon/provider.  Anesthesia Plan Notes: Premedication with Midazolam  After IV induction and GETA  +/-jerson  May place jerson after induction Given his age and good health it is likely unnecessary    May use remifentanyl to hasten emergence  Precedex low dose to lessen emergence delirium  PACU postop then ICU afterwards if warranted by surgery    Ready For Surgery From Anesthesia Perspective.     .

## 2024-09-23 NOTE — DISCHARGE INSTRUCTIONS
POSTERIOR THORACO-LUMBAR/ LUMBAR FUSION  DISCHARGE INSTRUCTIONS      1.  Wear your TLSO Brace when sitting upright and when you are out of bed.    Your brace will likely be discontinued after 3 months.      2.   Keep your incision clean and dry.    Your sutures or staples will be removed at your first postoperative follow-up appointment in approximately 14 days.   Place clean gauze and tape over your wound daily until your sutures or staples are removed.  Wait at least 72 hours from the time of your surgery to take a shower.  After showering, pat your incision dry and replace the wound dressing.  Do not immerse your incision in water for 4-6 weeks (e.g. bath tub, hot tub, swimming pool).      3.  Activity restrictions:  No lifting greater than 15 pounds for 3 months.  No bending, stooping, or twisting.  Avoid sitting in one position for over 30 minutes at a time.  No impact exercise or contact sports for at least 3 months.  No driving for at least 2 weeks.  To resume driving short distances (<30 minutes), you must be off of your narcotic medications and be able to comfortably brake suddenly, should the need arise.    Get up and walk.      4.  Contact your Neurosurgeon if the following occurs:  Signs and symptoms of infection, including fever above 101.5 degrees Fahrenheit and/or chills.  Redness, swelling, warmth, or drainage from the incision.  Any lasting changes in sensation, numbness, and/or tingling.  Increased weakness or increased pain.  Swelling of the foot and/or lower leg with calf pain.    Neurosurgery has office hours Monday through Friday, 8:00 AM to 4:00 PM except for holidays. There is an answering service available during non-office hours, with 24 hours neurosurgery coverage.  Report to the Emergency Department if you need immediate medical assistance.      Because you have had a fusion, you are not to take steroidal medications or non-steroidal anti-inflammatory (NSAID) medications such as Motrin/  Ibuprofen or Naprosyn/ Naproxen unless agreed upon with your neurosurgeon.      Please contact Dr. Mehta's office for any questions or concerns.  Typically, your first follow-up appointment after a posterior thoraco-lumbar/ lumbar fusion surgery is approximately 14 days from the date of your operation.  At this time, sutures or staples will be removed.  For your second postoperative appointment in 4-6 weeks, an x-ray of your lumbar spine will be arranged in Radiology before your neurosurgery appointment.

## 2024-09-23 NOTE — ANESTHESIA PROCEDURE NOTES
Intubation    Date/Time: 9/23/2024 12:59 PM    Performed by: Ezio Apodaca CRNA  Authorized by: Maddi Nguyen MD    Intubation:     Induction:  Intravenous    Intubated:  Postinduction    Mask Ventilation:  Easy mask    Attempts:  1    Attempted By:  CRNA    Method of Intubation:  Direct    Blade:  Quinn 2    Laryngeal View Grade: Grade I - full view of cords      Difficult Airway Encountered?: No      Complications:  None    Airway Device:  Oral endotracheal tube    Airway Device Size:  7.5    Style/Cuff Inflation:  Cuffed (inflated to minimal occlusive pressure)    Inflation Amount (mL):  9    Tube secured:  22    Secured at:  The lips    Placement Verified By:  Capnometry    Complicating Factors:  None    Findings Post-Intubation:  BS equal bilateral and atraumatic/condition of teeth unchanged

## 2024-09-24 LAB
ANION GAP SERPL CALC-SCNC: 9 MEQ/L
BASOPHILS # BLD AUTO: 0.01 X10(3)/MCL
BASOPHILS NFR BLD AUTO: 0.1 %
BUN SERPL-MCNC: 13.8 MG/DL (ref 8.9–20.6)
CALCIUM SERPL-MCNC: 8.6 MG/DL (ref 8.4–10.2)
CHLORIDE SERPL-SCNC: 108 MMOL/L (ref 98–107)
CO2 SERPL-SCNC: 24 MMOL/L (ref 22–29)
CREAT SERPL-MCNC: 0.96 MG/DL (ref 0.73–1.18)
CREAT/UREA NIT SERPL: 14
EOSINOPHIL # BLD AUTO: 0 X10(3)/MCL (ref 0–0.9)
EOSINOPHIL NFR BLD AUTO: 0 %
ERYTHROCYTE [DISTWIDTH] IN BLOOD BY AUTOMATED COUNT: 11.3 % (ref 11.5–17)
GFR SERPLBLD CREATININE-BSD FMLA CKD-EPI: >60 ML/MIN/1.73/M2
GLUCOSE SERPL-MCNC: 129 MG/DL (ref 74–100)
HCT VFR BLD AUTO: 35.4 % (ref 42–52)
HGB BLD-MCNC: 12.6 G/DL (ref 14–18)
IMM GRANULOCYTES # BLD AUTO: 0.04 X10(3)/MCL (ref 0–0.04)
IMM GRANULOCYTES NFR BLD AUTO: 0.4 %
LYMPHOCYTES # BLD AUTO: 1.03 X10(3)/MCL (ref 0.6–4.6)
LYMPHOCYTES NFR BLD AUTO: 9.6 %
MCH RBC QN AUTO: 32.8 PG (ref 27–31)
MCHC RBC AUTO-ENTMCNC: 35.6 G/DL (ref 33–36)
MCV RBC AUTO: 92.2 FL (ref 80–94)
MONOCYTES # BLD AUTO: 0.83 X10(3)/MCL (ref 0.1–1.3)
MONOCYTES NFR BLD AUTO: 7.7 %
NEUTROPHILS # BLD AUTO: 8.82 X10(3)/MCL (ref 2.1–9.2)
NEUTROPHILS NFR BLD AUTO: 82.2 %
NRBC BLD AUTO-RTO: 0 %
PLATELET # BLD AUTO: 164 X10(3)/MCL (ref 130–400)
PMV BLD AUTO: 9.3 FL (ref 7.4–10.4)
POTASSIUM SERPL-SCNC: 4.4 MMOL/L (ref 3.5–5.1)
RBC # BLD AUTO: 3.84 X10(6)/MCL (ref 4.7–6.1)
SODIUM SERPL-SCNC: 141 MMOL/L (ref 136–145)
WBC # BLD AUTO: 10.73 X10(3)/MCL (ref 4.5–11.5)

## 2024-09-24 PROCEDURE — 25000003 PHARM REV CODE 250

## 2024-09-24 PROCEDURE — 97165 OT EVAL LOW COMPLEX 30 MIN: CPT

## 2024-09-24 PROCEDURE — 63600175 PHARM REV CODE 636 W HCPCS

## 2024-09-24 PROCEDURE — 11000001 HC ACUTE MED/SURG PRIVATE ROOM

## 2024-09-24 PROCEDURE — 25000003 PHARM REV CODE 250: Performed by: NEUROLOGICAL SURGERY

## 2024-09-24 PROCEDURE — 80048 BASIC METABOLIC PNL TOTAL CA: CPT

## 2024-09-24 PROCEDURE — 85025 COMPLETE CBC W/AUTO DIFF WBC: CPT

## 2024-09-24 PROCEDURE — 97162 PT EVAL MOD COMPLEX 30 MIN: CPT

## 2024-09-24 PROCEDURE — 99024 POSTOP FOLLOW-UP VISIT: CPT | Mod: ,,, | Performed by: NEUROLOGICAL SURGERY

## 2024-09-24 PROCEDURE — 36415 COLL VENOUS BLD VENIPUNCTURE: CPT

## 2024-09-24 RX ORDER — PHENYLEPHRINE HYDROCHLORIDE 100 MG/ML
1 SOLUTION/ DROPS OPHTHALMIC ONCE
Status: DISCONTINUED | OUTPATIENT
Start: 2024-09-24 | End: 2024-09-27 | Stop reason: HOSPADM

## 2024-09-24 RX ORDER — TROPICAMIDE 10 MG/ML
1 SOLUTION/ DROPS OPHTHALMIC ONCE
Status: DISCONTINUED | OUTPATIENT
Start: 2024-09-24 | End: 2024-09-27 | Stop reason: HOSPADM

## 2024-09-24 RX ORDER — TALC
6 POWDER (GRAM) TOPICAL NIGHTLY PRN
Status: DISCONTINUED | OUTPATIENT
Start: 2024-09-24 | End: 2024-09-27 | Stop reason: HOSPADM

## 2024-09-24 RX ORDER — OXYCODONE AND ACETAMINOPHEN 10; 325 MG/1; MG/1
1 TABLET ORAL EVERY 6 HOURS PRN
Status: DISCONTINUED | OUTPATIENT
Start: 2024-09-24 | End: 2024-09-27 | Stop reason: HOSPADM

## 2024-09-24 RX ORDER — PROPARACAINE HYDROCHLORIDE 5 MG/ML
1 SOLUTION/ DROPS OPHTHALMIC ONCE
Status: DISCONTINUED | OUTPATIENT
Start: 2024-09-24 | End: 2024-09-27 | Stop reason: HOSPADM

## 2024-09-24 RX ADMIN — METHOCARBAMOL 750 MG: 750 TABLET ORAL at 03:09

## 2024-09-24 RX ADMIN — MORPHINE SULFATE 1 MG: 4 INJECTION, SOLUTION INTRAMUSCULAR; INTRAVENOUS at 01:09

## 2024-09-24 RX ADMIN — CEFAZOLIN SODIUM 2 G: 2 SOLUTION INTRAVENOUS at 04:09

## 2024-09-24 RX ADMIN — OXYCODONE AND ACETAMINOPHEN 1 TABLET: 10; 325 TABLET ORAL at 04:09

## 2024-09-24 RX ADMIN — DOCUSATE SODIUM 100 MG: 100 CAPSULE, LIQUID FILLED ORAL at 08:09

## 2024-09-24 RX ADMIN — METHOCARBAMOL 750 MG: 750 TABLET ORAL at 10:09

## 2024-09-24 RX ADMIN — MUPIROCIN: 20 OINTMENT TOPICAL at 09:09

## 2024-09-24 RX ADMIN — CEFAZOLIN SODIUM 2 G: 2 SOLUTION INTRAVENOUS at 08:09

## 2024-09-24 RX ADMIN — ONDANSETRON 8 MG: 4 TABLET, ORALLY DISINTEGRATING ORAL at 01:09

## 2024-09-24 RX ADMIN — OXYCODONE AND ACETAMINOPHEN 1 TABLET: 10; 325 TABLET ORAL at 10:09

## 2024-09-24 RX ADMIN — MUPIROCIN: 20 OINTMENT TOPICAL at 10:09

## 2024-09-24 RX ADMIN — PROCHLORPERAZINE EDISYLATE 5 MG: 5 INJECTION INTRAMUSCULAR; INTRAVENOUS at 01:09

## 2024-09-24 RX ADMIN — CEFAZOLIN SODIUM 2 G: 2 SOLUTION INTRAVENOUS at 09:09

## 2024-09-24 RX ADMIN — DOCUSATE SODIUM 100 MG: 100 CAPSULE, LIQUID FILLED ORAL at 09:09

## 2024-09-24 RX ADMIN — SODIUM CHLORIDE 500 ML: 9 INJECTION, SOLUTION INTRAVENOUS at 09:09

## 2024-09-24 RX ADMIN — Medication 6 MG: at 08:09

## 2024-09-24 NOTE — PLAN OF CARE
Problem: Adult Inpatient Plan of Care  Goal: Plan of Care Review  Reactivated     Problem: Infection  Goal: Absence of Infection Signs and Symptoms  Reactivated     Problem: Wound  Goal: Absence of Infection Signs and Symptoms  Reactivated     Problem: Skin Injury Risk Increased  Goal: Skin Health and Integrity  Reactivated

## 2024-09-24 NOTE — PT/OT/SLP EVAL
Physical Therapy Evaluation and Discharge Note    Patient Name:  Matthew Romero   MRN:  [id number]    Recommendations:     Discharge therapy intensity: No Therapy Indicated   Discharge Equipment Recommendations: none  Barriers to discharge: Impaired mobility    Assessment:     Matthew Romero is a 39 y.o. male admitted with a medical diagnosis of  s/p L3-4, L4-5 laminectomy and posterolateral fusion.  At this time, patient is functioning at their prior level of function and does not require further acute PT services. Pt is independent with mobility, PT to sign off. Pt was taught to perform log roll technique into and out of bed. The pt was able to ambulate with mod I in the pierce with no AD.    Recent Surgery: Procedure(s) (LRB):  FUSION, SPINE, LUMBAR, PLIF, USING COMPUTER-ASSISTED NAVIGATION (N/A) 1 Day Post-Op    Plan:     During this hospitalization, patient does not require further acute PT services.  Please re-consult if situation changes.      Subjective     Chief Complaint: none  Patient/Family Comments/goals: return to PLOF  Pain/Comfort:  Location 1: back  Pain Addressed 1: Reposition, Distraction    Patients cultural, spiritual, Gnosticism conflicts given the current situation:      Living Environment:  Home with spouse, SL home, no steps to enter.   Prior to admission, patients level of function was independent.  Equipment used at home: none.  DME owned (not currently used): none.  Upon discharge, patient will have assistance from spouse.    Objective:     Communicated with NSG prior to session.  Patient found supine with hemovac upon PT entry to room.    General Precautions: Standard, fall    Orthopedic Precautions:spinal precautions   Braces: LSO  Respiratory Status: Room air  Blood Pressure:     Exams:  RLE ROM: WFL  RLE Strength: WFL  LLE ROM: WFL  LLE Strength: WFL    Functional Mobility:  Bed Mobility:     Supine to Sit: modified independence- with verbal cues for log roll  Sit to Supine: modified  independence  Transfers:     Sit to Stand:  modified independence with no AD  Gait: pt ambulates x 150 feet with mod I and no AD.      Patient provided with verbal education education regarding PT role/goals/POC, safety awareness, and discharge/DME recommendations.  Understanding was verbalized.     Patient left supine with all lines intact, call button in reach, and NSG notified.    GOALS:   Multidisciplinary Problems       Physical Therapy Goals       Not on file                    History:     Past Medical History:   Diagnosis Date    Anxiety     Arthritis     Cervicalgia     Chronic bilateral low back pain with bilateral sciatica     Lumbar disc disease with radiculopathy     Muscle spasm of back     Myalgia, unspecified site     Numbness and tingling of left leg     Pain     Radiculopathy, lumbosacral region     Sciatica, left side     Segmental and somatic dysfunction of cervical region     Segmental and somatic dysfunction of lumbar region     Segmental and somatic dysfunction of pelvic region     Segmental and somatic dysfunction of sacral region     Segmental and somatic dysfunction of thoracic region     Stiffness of left hip, not elsewhere classified     Tobacco dependence        Past Surgical History:   Procedure Laterality Date    BACK SURGERY  2007    microdiscectomy L4-5    HERNIA REPAIR      POSTERIOR LUMBAR INTERBODY FUSION (PLIF) WITH COMPUTER-ASSISTED NAVIGATION N/A 9/23/2024    Procedure: FUSION, SPINE, LUMBAR, PLIF, USING COMPUTER-ASSISTED NAVIGATION;  Surgeon: Mirna Mehta MD;  Location: [facility];  Service: Neurosurgery;  Laterality: N/A;  Redo lumbar surgery with L3-4, L4-5 laminectomy & posterolateral fusion // O-arm // NTI // TIVA // [employer]tr[employer] // XX    WISDOM TOOTH EXTRACTION         Time Tracking:     PT Received On: 09/24/24  PT Start Time: 1315     PT Stop Time: 1330  PT Total Time (min): 15 min     Billable Minutes: Evaluation 15      09/24/2024

## 2024-09-24 NOTE — PT/OT/SLP EVAL
Occupational Therapy   Evaluation and Discharge Note    Name: Matthew Romero  MRN: [id number]  Admitting Diagnosis: s/p L3-4, L4-5 laminectomy and posterolateral fusion   Recent Surgery: Procedure(s) (LRB):  FUSION, SPINE, LUMBAR, PLIF, USING COMPUTER-ASSISTED NAVIGATION (N/A) 1 Day Post-Op    Recommendations:     Discharge therapy intensity: No Therapy Indicated   Discharge Equipment Recommendations: none  Barriers to discharge:  None    Assessment:     Matthew Romero is a 39 y.o. male with a medical diagnosis of s/p L3-4, L4-5 laminectomy and posterolateral fusion. On eval, patient able to complete ADLs and mobilize c Mod I/IND. Pt educated on spinal precautions and brace wearing schedule- pt verbalized understanding. OT provided pt c reacher and sock aide for LB dressing. Pt able to return demonstration c Mod I. Spouse reported she will be able to assist as needed at home. Skilled OT services are not warranted at this time.    Plan:     OT to sign off as acute OT services are not warranted at this time.  Please re-consult if situation changes during this hospitalization.    Plan of Care Reviewed with: patient, spouse    Subjective     Chief Complaint: Back pain   Patient/Family Comments/goals: To go home     Occupational Profile:  Living Environment: Pt lives c spouse in a SLH c a threshold to enter. Reported he has a tub and a walk in shower c a seat.   Previous level of function: IND c ADLs and mobility without AD.   Roles and Routines: Works in [employer]  Equipment Used at Home:  (Pt has access to RW)  Assistance upon Discharge: Pt's spouse and mother     Pain/Comfort:  Pain Rating 1: 8/10  Location 1: back  Pain Addressed 1: Reposition, Distraction    Patients cultural, spiritual, Presybeterian conflicts given the current situation: no    Objective:     OT communicated with RN prior to session.      Patient was found supine with hemovac upon OT entry to room.    General Precautions: Standard, fall  Orthopedic  Precautions: spinal precautions  Braces: LSO      Bed Mobility:    Patient completed Supine to Sit with modified independence  Patient completed Sit to Supine with contact guard assistance  Pt educated on log roll transfers    Functional Mobility/Transfers:  Patient completed Sit <> Stand Transfer with independence  with  no assistive device   Patient completed Toilet Transfer Step Transfer technique with independence with  no AD  Functional Mobility: Pt ambulated to bathroom INDly without AD. No LOB.     Activities of Daily Living:  Upper Body Dressing: independence Don/doff LSO  Lower Body Dressing: modified independence Pt able to doff/don socks using reacher and sock aide c Mod I. Pt provided with verbal education on how to use reacher to don shorts.       Functional Cognition:  Intact    Visual Perceptual Skills:  Intact    Upper Extremity Function:  Right Upper Extremity:   WFL    Left Upper Extremity:  WFL      Therapeutic Positioning  Risk for acquired pressure injuries is decreased due to ability to mobilize independently .    Patient Education:  Patient and spouse were provided with verbal education education regarding OT role/goals/POC, post op precautions, and Discharge/DME recommendations.  Understanding was verbalized.     Patient left supine with all lines intact and call button in reach.    History:     Past Medical History:   Diagnosis Date    Anxiety     Arthritis     Cervicalgia     Chronic bilateral low back pain with bilateral sciatica     Lumbar disc disease with radiculopathy     Muscle spasm of back     Myalgia, unspecified site     Numbness and tingling of left leg     Pain     Radiculopathy, lumbosacral region     Sciatica, left side     Segmental and somatic dysfunction of cervical region     Segmental and somatic dysfunction of lumbar region     Segmental and somatic dysfunction of pelvic region     Segmental and somatic dysfunction of sacral region     Segmental and somatic dysfunction  of thoracic region     Stiffness of left hip, not elsewhere classified     Tobacco dependence          Past Surgical History:   Procedure Laterality Date    BACK SURGERY  2007    microdiscectomy L4-5    HERNIA REPAIR      POSTERIOR LUMBAR INTERBODY FUSION (PLIF) WITH COMPUTER-ASSISTED NAVIGATION N/A 9/23/2024    Procedure: FUSION, SPINE, LUMBAR, PLIF, USING COMPUTER-ASSISTED NAVIGATION;  Surgeon: Mirna Mehta MD;  Location: [facility];  Service: Neurosurgery;  Laterality: N/A;  Redo lumbar surgery with L3-4, L4-5 laminectomy & posterolateral fusion // O-arm // NTI // TIVA // [employer] // XX    WISDOM TOOTH EXTRACTION         Time Tracking:     OT Date of Treatment:    OT Start Time: 1333  OT Stop Time: 1351  OT Total Time (min): 18 min    Billable Minutes:Evaluation Low complexity     9/24/2024

## 2024-09-24 NOTE — PROGRESS NOTES
Hospital Progress Note  [facility] Neurosurgery      Admit Date: 9/23/2024  Post-operative Day: 1 Day Post-Op  Hospital Day: 2        SUBJECTIVE:     POD #1 s/p redo lumbar surgery with L3-4, L4-5 laminectomy and posterolateral fusion    Interval History:  I evaluated Mr. Romero with his significant other at the bedside.  He is lying on his left side with his LSO brace in place in anticipation of physical therapy.  Current pain level 5/10, NAD. Anticipates that pain will increase once PT comes because Norco does not work well for him. Requesting med change. Ambulating well around the room over night. Urinating without difficulty. Complaining of seeing floaters this morning, mi having these in the past. Bruising present to bilateral hips after being prone for surgery, requesting wound care consult. Reports resolution of bilateral leg pain and that was present preoperatively.     Scheduled Meds:   ceFAZolin (Ancef) IV (PEDS and ADULTS)  2 g Intravenous Q6H    docusate sodium  100 mg Oral BID    mupirocin   Nasal BID    sodium chloride 0.9%  500 mL Intravenous Once    sodium chloride 0.9%  500 mL Intravenous Once     Continuous Infusions:   0.9% NaCl   Intravenous Continuous 100 mL/hr at 09/24/24 0510 Rate Verify at 09/24/24 0510     PRN Meds:  Current Facility-Administered Medications:     aluminum-magnesium hydroxide-simethicone, 30 mL, Oral, Q4H PRN    methocarbamoL, 750 mg, Oral, TID PRN    morphine, 1 mg, Intravenous, Q4H PRN    ondansetron, 8 mg, Oral, Q6H PRN    oxyCODONE-acetaminophen, 1 tablet, Oral, Q6H PRN    prochlorperazine, 5 mg, Intravenous, Q6H PRN    senna-docusate 8.6-50 mg, 2 tablet, Oral, Nightly PRN    Review of patient's allergies indicates:  No Known Allergies    Past Medical History:   Diagnosis Date    Anxiety     Arthritis     Cervicalgia     Chronic bilateral low back pain with bilateral sciatica     Lumbar disc disease with radiculopathy     Muscle spasm of back      Myalgia, unspecified site     Numbness and tingling of left leg     Pain     Radiculopathy, lumbosacral region     Sciatica, left side     Segmental and somatic dysfunction of cervical region     Segmental and somatic dysfunction of lumbar region     Segmental and somatic dysfunction of pelvic region     Segmental and somatic dysfunction of sacral region     Segmental and somatic dysfunction of thoracic region     Stiffness of left hip, not elsewhere classified     Tobacco dependence      Past Surgical History:   Procedure Laterality Date    BACK SURGERY  2007    microdiscectomy L4-5    HERNIA REPAIR      WISDOM TOOTH EXTRACTION         OBJECTIVE:     Vital Signs (Most Recent)  Temp: 98.2 °F (36.8 °C) (09/24/24 0752)  Pulse: 65 (09/24/24 0753)  Resp: 18 (09/24/24 0752)  BP: (!) 99/54 (09/24/24 0753)  SpO2: 98 % (09/24/24 0753)    Vital Signs Range (Last 24H):  Temp:  [97.3 °F (36.3 °C)-98.4 °F (36.9 °C)]   Pulse:  []   Resp:  [11-18]   BP: ()/(47-92)   SpO2:  [92 %-100 %]     Drain: 370 cc since surgery, 285 cc 8 hrs serosanguinous      Physical Exam:  General Alert, no acute distress  GCS- 15  E-4, V-5, M-6    Opens eyes spontaneously  Oriented x 3  Follows commands in all extremities     Cranial Nerves PERRL, brisk bilaterally  EOMI  Face symmetric  Tongue midline     Motor Strength 5/5 x 4 extremities   No pronator drift        Sensory Nl to light touch x 4 extremities     Wound Dressing- moderate drainage, dry, and intact         Laboratory Review:    CBC without Differential:  Lab Results   Component Value Date    WBC 10.73 09/24/2024    HGB 12.6 (L) 09/24/2024    HCT 35.4 (L) 09/24/2024     09/24/2024    MCV 92.2 09/24/2024     Differential:   Lab Results   Component Value Date    NEUTROABS 2.2 01/18/2024    LYMPHSABS 1.9 01/18/2024       Basic Metabolic Panel:  Lab Results   Component Value Date     09/24/2024    K 4.4 09/24/2024     (H) 09/24/2024    BUN 13.8 09/24/2024  "    Coagulation Studies:  Lab Results   Component Value Date    INR 1.0 09/13/2024     No results found for: "PTT"  Urinalysis:  Lab Results   Component Value Date    LEUKOCYTESUR Negative 09/13/2024    UROBILINOGEN Normal 09/13/2024        Radiology:    I have personally reviewed and evaluated the following reports as well as radiographic studies:        ASSESSMENT/PLAN:     ?is a 39 y.o.?male?who has a past medical history significant for anxiety. ?The patient is s/p a left L4-5 diskectomy on 03/01/2007 by . He is currently POD #1 s/p redo lumbar surgery with L3-4, L4-5 laminectomy and posterolateral fusion. He has a normal motor and sensory exam.     Plan:     Floor status  Neuro checks Q4h  Continue Hemovac drain. Continue ancef.   Continue to document drain output Q4 and Q24.  Mm pain control. Norco 10 changed to percocet 10 for better coverage.  Encouraged PT/OT.  Wound care consult for bruising to bilateral hips.   Ophthalmology consult d/t complaints of floaters  LSO brace any time HOB greater than 30 degrees or OOB  CM consult for discharge planning  SCS for DVT  Fall precautions    Arrangements have been made for Mr. Jimenez to follow up 2 weeks postoperatively with TERA Saldana in the neurosurgery clinic on 10/08/2024 at 10:30 am.    Neurosurgery will continue to follow the patient.  Please do not hesitate to contact neurosurgery for any additional questions or concerns.       JAQUELINE Russo  Neurosurgery       "

## 2024-09-24 NOTE — NURSING
Nurses Note -- 4 Eyes      9/24/2024   08:00AM       Skin assessed during: Q Shift Change      [] No Altered Skin Integrity Present    []Prevention Measures Documented      [x] Yes- Altered Skin Integrity Present or Discovered   [x] LDA Added if Not in Epic (Describe Wound)   [] New Altered Skin Integrity was Present on Admit and Documented in LDA   [x] Wound Image Taken    Wound Care Consulted? No    Attending Nurse:  AYLA Connolly RN/Staff Member:   GREGORIA Gallagher

## 2024-09-24 NOTE — ANESTHESIA POSTPROCEDURE EVALUATION
"Anesthesia Post Evaluation    Patient: Matthew Romero    Procedure(s) Performed: Procedure(s) (LRB):  FUSION, SPINE, LUMBAR, PLIF, USING COMPUTER-ASSISTED NAVIGATION (N/A)  Pt with complaints of seeing "floaters"- case reviewed and vitals were stable throughout  Discussed case with Dr. Mehta and Dr. Gary Bowens - pt will need dilated optho examination by ophthalmologist on call and if needed, pt can have outpatient followup with Dr. Bowens (retina specialist)    Final Anesthesia Type: general      Patient location during evaluation: PACU  Patient participation: Yes- Able to Participate  Level of consciousness: awake and alert  Post-procedure vital signs: reviewed and stable  Pain management: adequate  Airway patency: patent    PONV status at discharge: No PONV  Anesthetic complications: no      Cardiovascular status: hemodynamically stable  Respiratory status: unassisted  Hydration status: euvolemic  Follow-up not needed.              Vitals Value Taken Time   BP 99/54 09/24/24 0753   Temp 36.8 °C (98.2 °F) 09/24/24 0752   Pulse 65 09/24/24 0753   Resp 18 09/24/24 0752   SpO2 98 % 09/24/24 0753         Event Time   Out of Recovery 21:22:00         Pain/Earl Score: Pain Rating Prior to Med Admin: 6 (9/24/2024  1:34 AM)  Pain Rating Post Med Admin: 3 (9/24/2024  1:55 AM)  Earl Score: 8 (9/23/2024  9:20 PM)          "

## 2024-09-24 NOTE — OP NOTE
Neurosurgery Operative Note  [facility]      Patient Name: Matthew Romero  MRN: [id number]  CSN:  [id number]  : 1985  Age:39 yrs  Sex:male  Admit Date: 2024    Surgery date: 2024   Surgeons and Role:     * Mirna Mehta MD - Primary  Assistant(s): TERA Vega    Preop/ Preprocedure Diagnosis:    1)  Previous left L4-5 disectomy  2)  L3-4, L4-5 spondylosis   3)  Radiculopathy in left lower extremity    Postop/ Postprocedure Diagnosis:   1)  Previous left L4-5 disectomy  2)  L3-4, L4-5 spondylosis   3)  Radiculopathy in left lower extremity      Surgery:     1)  Posterior lateral fusion with instrumentation from L3-4 to L4-5 with [employer] Solera pedicle screws:         A.  L3- Right- 6.5 x 40 mm pedicle screw, Left- 5.5 x 45 mm pedicle screw      B.  L4- 6.5 x 45 mm pedicle screws bilaterally      C.  L5- 6.5 x 40 mm pedicle screws bilaterally            D.  Placement of bilateral rods measuring 70 mm from L3 to L5     2)  Redo lumbar surgery with decompressive laminectomies and bilateral medial facetectomies with foraminotomies at L3-4 and L4-5    3)  Placement of local autograft, allograft, DBM, and Pitman Strips bilaterally     4)  Use of Medtronic Stealth navigation and intraoperative O Arm for placement of pedicle screws and to verify final placement of spinal instrumentation     5)  Use of intraoperative C-arm      6)  Use of intraoperative neuromonitoring with impedance testing of lumbar pedicle screws, with confirmed impedance of greater than 15 in each screw      Findings:  Intraoperative decompression at L3-4 and L4-5., which involved removal of adherent scar tissue along the dura. The final C arm and O arm images demonstrated that the instrumentation was in good position.    Fluids: See anesthesia record  Estimated Blood Loss:  500 cc  Anesthesia Type: General  Blood Products: none  Specimens:  * No specimens in log *    Implants/Grafts:   Implant Name  Type Inv. Item Serial No.  Lot No. LRB No. Used Action   SCREW HORIZON SOLERA 5.5X45MM - [id number]  SCREW HORIZON SOLERA 5.5X45MM  [employer] [place]  N/A 1 Implanted   SCREW HORIZON SOLERA 6.5X40 - [id number]  SCREW HORIZON SOLERA 6.5X40  [employer] [place]  N/A 3 Implanted   SCREW HORIZON SOLERA 6.5X45MM - [id number]  SCREW HORIZON SOLERA 6.5X45MM  [employer] [place]  N/A 2 Implanted   SET SCREW HORIZON SOLERA 5.5-6 - [id number]  SET SCREW HORIZON SOLERA 5.5-6  [employer] [place]  N/A 6 Implanted   [employer] PUTTY MIKE DBM SIZE 5CC  REF#[id number]   [id number] [employer]  N/A 1 Implanted   [employer]   CODE15 410BP  SIZE 15CC  CANCELLOUS(4-10MM)   [id number] [employer]  N/A 1 Implanted   PUTTY MIKE DBM 10X1CM - [id number]  PUTTY MIKE DBM 10X1CM [id number] [employer] [place]  N/A 1 Implanted   CONCHIS HORIZON CURV 5.5CCM 70MM - [id number]  CONCHIS HORIZON CURV 5.5CCM 70MM  [employer] [place]  N/A 2 Implanted     Drain(s), Tube(s), Packing: Hemovac drain    Complications: No immediate      Preoperative Indications:   ?is a 39 y.o.?male?who has a past medical history significant for anxiety. ?The patient is s/p a left L4-5 diskectomy on 03/01/2007 by . ?He presented as a?follow up patient in the neurosurgery clinic for?progressively worsening low back pain radiating into his left leg for the last 1 1/2 years.  Mr. Romero had a normal motor and sensory neurological exam.     I reviewed pertinent imaging studies with the patient and his significant other.  A MRI lumbar spine without gadolinium completed on 02/01/2024 was reviewed.  When compared to a MRI lumbar spine without gadolinium that was completed on 03/14/2023, there were no significant changes. There was normal alignment with postoperative changes s/p a left L4-5 diskectomy.  L4-5 had degenerative disc disease with Modic changes. At L3-4, there was a disc bulge with an annular tear associated with mild stenosis and bilateral neuroforaminal  narrowing.  Left L4-5 disc herniation with bilateral neuroforaminal narrowing.  At L5-S1, there was right-greater-than-left neuroforaminal narrowing.     I discussed with Mr. Romero and his significant other that his symptoms of low back pain and radiating left leg pain were related to the degenerative changes in his lumbar spine. Because his symptoms had not significantly improved with optimization of medical management, he was considered a candidate surgery, specifically a redo lumbar surgery involving a L3-4, L4-5 laminectomy and posterolateral fusion.  I reviewed the risks, benefits, and alternatives of this surgery.  I specifically discussed with Mr. Romero that there were associated risks of adjacent level degeneration next to these fusion levels, which needed to be considered at his young age.  He agreed to proceed.  All questions were answered to his satisfaction. Mr. Romero signed a surgical consent form.       Operative Procedure:  The patient was brought to the operating room.  General endotracheal intubation was instituted by the anesthesia team. The patient was then turned prone onto a Kilo frame with his arms and legs appropriately padded.  Neuromonitoring with SSEP, EMG, and MEP was performed throughout the procedure with slight improvement of signals at the conclusion of surgery when compared to baseline.     It was anticipated that a midline incision be made on the patient's lower back along his previous scar, which was extended both cranially and caudally.  A C-arm image was performed that correctly identified the L3, L4, and L5 levels before proceeding.  The patient's back was prepped and draped in a normal sterile fashion.  A timeout was performed that correctly identified the patient, preoperative antibiotics, and procedure.    A solution of 1% Lidocane with epinephrine was infused into the anticipated incisional site.  The initial skin incision was made with an 10 scalpel blade.  Bovdoris  cautery and and an Aquamantys were used for hemostasis and for carrying out this incision to the spinous processes. The Bovie was used for dissection from the midline to the lateral aspects of the bilateral facets.  Scar tissue from his previous lumbar surgery was incised.  Cerebellar and Zelpi retractors were placed for visualization.  A C-arm image was performed to identify the pedicles from L3 to L5.    A clamp with the Medtronic Stealth system was placed on the L2 spinous process.  An O arm image was obtained.    With intraoperative guidance from the Medtronic Stealth system and O arm images, bilateral pedicle screws at L3, L4, and L5 were placed.  At each level, the entry point for the screw was determined with a navigated drill.  Then, a series of steps involving the navigated, power-generated [id number] tap, pedicle feeler without a navigated ball probe, pedicle feeler with a navigated ball probe, with final placement of a pedicle screw. This was performed bilaterally from L3-4 to L4-5 with intraoperative navigation. Posterior lateral fusion with instrumentation was completed with Medtronic Solera pedicle screws:         A.  L3- Right- 6.5 x 40 mm pedicle screw, Left- 5.5 x 45 mm pedicle screw      B.  L4- 6.5 x 45 mm pedicle screws bilaterally      C.  L5- 6.5 x 40 mm pedicle screws bilaterally            D.  Placement of bilateral rods measuring 70 mm from L3 to L5       Use of intraoperative neuromonitoring with impedance testing was completed for the bilateral L3, L4, and L5 pedicle screws, with confirmed impedance greater than 15 in each screw.  Images with the O arm confirmed that this instrumentation was in good position.      Laminectomies with bilateral medial facetectomies and foraminotomies were performed at the L3-4 and L4-5 levels.  A rongeur was used to remove the spinous processes of L3, L4, and L5.  A high-speed drill was used to thin the lumbar lamina.  Using a technique with a curved  curette and Kerrison punch, decompression of neural elements was completed.  Scar tissue required extra dissection with a curette along the dura at the left L4-5 level, which had undergone previous surgery.     A 70 mm ariel was placed between the L3, L4, and L5 pedicle screws on the right as well as on the left.  The set screw caps were secured.  The surgical wound was copiously irrigated.  Decortication was performed with the drill.  Secloretronic Carroll Strips were placed bilaterally, spanning from L3 to L5 laterally.  A mixture of morselized local autograft, allograft, and DBM was placed laterally along the pedicle screws.       C-arm imaging with AP and lateral views demonstrated that the instrumentation was in proper position. Closure was then in order.  The fascia was infused with a solution of 0.5% Bupivicaine with epinephrine.  A medium size Hemovac drain was placed under the fascia and sutured into place with an 0 Vicryl. The fascia was closed with interrupted 0 Vicryl sutures. The subcutaneous layer was closed with interrupted, buried 2-0 Vicryl sutures.  The skin was everted and closed with staples.  An Aquacel, 4x4s, and Medipore tape were placed on top of the skin as the dressing.     The patient was brought back into the supine position and extubated.  He was transported to the PACU for further care.        NP Shana Vega was present for the entire case and assisted throughout the procedure.  She performed crucial functions including but not limited to positioning, suctioning, retraction of critical and delicate neural structures, as well as terminal portions of wound closure.         Mirna Mehta MD  Neurosurgery

## 2024-09-24 NOTE — NURSING
Nurses Note -- 4 Eyes      9/24/2024   2:38 AM      Skin assessed during: Admit      [] No Altered Skin Integrity Present    []Prevention Measures Documented      [x] Yes- Altered Skin Integrity Present or Discovered   [] LDA Added if Not in Epic (Describe Wound)   [] New Altered Skin Integrity was Present on Admit and Documented in LDA   [] Wound Image Taken    Wound Care Consulted? No    Attending Nurse:  Aileen Ya RN/Staff Member:   daniela

## 2024-09-24 NOTE — TRANSFER OF CARE
Anesthesia Transfer of Care Note    Patient: Matthew Romero    Procedure(s) Performed: Procedure(s) (LRB):  FUSION, SPINE, LUMBAR, PLIF, USING COMPUTER-ASSISTED NAVIGATION (N/A)    Patient location: PACU    Anesthesia Type: general    Transport from OR: Transported from OR on room air with adequate spontaneous ventilation    Post pain: adequate analgesia    Post assessment: no apparent anesthetic complications and tolerated procedure well    Post vital signs: stable    Level of consciousness: sedated    Nausea/Vomiting: no nausea/vomiting    Complications: none    Transfer of care protocol was followed    Last vitals: Visit Vitals  /70   Pulse 68   Temp 36.9 °C (98.4 °F) (Oral)   Resp 18   Ht 6' (1.829 m)   Wt 86.6 kg (190 lb 14.7 oz)   SpO2 96%   BMI 25.89 kg/m²

## 2024-09-24 NOTE — PROGRESS NOTES
[facility] - Ortho Neuro  Wound Care    Patient Name:  Matthew Romero   MRN:  [id number]  Date: 9/24/2024  Diagnosis: Lumbar spondylosis    History:     Past Medical History:   Diagnosis Date    Anxiety     Arthritis     Cervicalgia     Chronic bilateral low back pain with bilateral sciatica     Lumbar disc disease with radiculopathy     Muscle spasm of back     Myalgia, unspecified site     Numbness and tingling of left leg     Pain     Radiculopathy, lumbosacral region     Sciatica, left side     Segmental and somatic dysfunction of cervical region     Segmental and somatic dysfunction of lumbar region     Segmental and somatic dysfunction of pelvic region     Segmental and somatic dysfunction of sacral region     Segmental and somatic dysfunction of thoracic region     Stiffness of left hip, not elsewhere classified     Tobacco dependence        Social History     Socioeconomic History    Marital status: Single   Tobacco Use    Smoking status: Former     Current packs/day: 1.50     Average packs/day: 1.5 packs/day for 15.0 years (22.5 ttl pk-yrs)     Types: Cigarettes    Smokeless tobacco: Never   Substance and Sexual Activity    Alcohol use: Never    Drug use: Never    Sexual activity: Yes     Social Determinants of Health     Financial Resource Strain: Low Risk  (1/19/2024)    [facility], [facility] and Its Subsidiaries and Affiliates    Overall Financial Resource Strain (CARDIA)     Difficulty of Paying Living Expenses: Not very hard   Food Insecurity: No Food Insecurity (1/19/2024)    Received from [facility] and Its Subsidiaries and Affiliates, [facility] and Its Subsidiaries and Affiliates    Hunger Vital Sign     Worried About Running Out of Food in the Last Year: Never true     Ran Out of  Food in the Last Year: Never true   Transportation Needs: No Transportation Needs (1/19/2024)    Received from [facility], [facility]    PRAPARE - Transportation     Lack of Transportation (Medical): No     Lack of Transportation (Non-Medical): No   Physical Activity: Inactive (1/19/2024)    [facility], [facility]    Exercise Vital Sign     Days of Exercise per Week: 0 days     Minutes of Exercise per Session: 0 min   Stress: No Stress Concern Present (1/19/2024)    Received from [facility], [facility]    Citizen of [place] of Occupational Health - Occupational Stress Questionnaire     Feeling of Stress : Only a little   Housing Stability: Unknown (1/19/2024)    [facility], [facility]    Housing Stability Vital Sign     Unable to Pay for Housing in the Last Year: No     Unstable Housing in the Last Year: No       Precautions:     Allergies as of 09/08/2024    (No Known Allergies)       WO Assessment Details/Treatment      09/24/24 1426   WOCN Assessment   Visit Date 09/24/24   Visit Time 1426   Consult Type New   [facility] Wound   Intervention chart review;assessed;applied;orders   Teaching on-going        Wound 09/24/24 1426 Other (comment) Right anterior Hip   Date First Assessed/Time First Assessed: 09/24/24 1426   Present on Original Admission: No  Primary Wound Type: (c) Other (comment)   Side: Right  Orientation: anterior  Location: Hip   Wound Image    Pressure Injury Stage O  (blanchable erythema)   Dressing Appearance Open to air;Dry;Intact   Drainage Amount None   Appearance Pink;Intact;Dry   Tissue loss description Not applicable   Black (%), Wound Tissue Color 0 %   Red (%), Wound Tissue Color 100 %   Yellow (%), Wound Tissue Color 0 %   Periwound Area Intact;Dry   Wound Edges Approximated   Wound Length (cm) 3.5 cm   Wound Width (cm) 3 cm   Wound Depth (cm) 0 cm   Wound Volume (cm^3) 0 cm^3   Wound Surface Area (cm^2) 10.5 cm^2   Care Cleansed with:;Soap and water   Dressing Applied;Foam        Wound 09/24/24 Other (comment) Left anterior Hip   Date First Assessed: 09/24/24   Primary Wound Type: (c) Other (comment)  Side: Left  Orientation: anterior  Location: Hip   Wound Image    Dressing Appearance Open to air;Dry;Intact   Drainage Amount None   Appearance Pink;Dry  (blanchable erythema)   Tissue loss description Not applicable   Black (%), Wound Tissue Color 0 %   Red (%), Wound Tissue Color 100 %   Yellow (%), Wound Tissue Color 0 %   Periwound Area Intact;Dry;Pink   Wound Edges Approximated   Wound Length (cm) 3.2 cm   Wound Width (cm) 3 cm   Wound Depth (cm) 0 cm   Wound Volume (cm^3) 0 cm^3   Wound Surface Area (cm^2) 9.6 cm^2   Care Cleansed with:;Soap and water   Dressing Foam     WOCN consulted for chest, bilateral hip blanchable erythema. Spoke with nurse, she reports patient was prone position for surgery approximately 8 hours. Pt raghav score is >18 and physical therapy has exercise program post-op. Introduced myself to patient and wife . Inst that wound care nurse has been consulted. Assessed wound, cleansed, photos obtained for EMR. Chest left open to area. Treatment recommendations: cleanse well with soap and water. Pat dry. Apply bordered foam dressing to be changed q 2 days and prn until healed. Pt educated on changing positions q2 hours. He was able to move himself in the  bed . Requesting MILAGRO mattress. Mattress ordered. Will follow up.   09/24/2024

## 2024-09-24 NOTE — PLAN OF CARE
09/24/24 1208   Discharge Assessment   Assessment Type Discharge Planning Assessment   Confirmed/corrected address, phone number and insurance Yes   Confirmed Demographics Correct on Facesheet   Source of Information patient   Communicated NESHA with patient/caregiver Date not available/Unable to determine   Reason For Admission lumbar spondylosis   People in Home significant other   Do you expect to return to your current living situation? Yes   Do you have help at home or someone to help you manage your care at home? Yes   Who are your caregiver(s) and their phone number(s)? tara anderson, SO, [phone]   Prior to hospitilization cognitive status: Unable to Assess   Current cognitive status: Alert/Oriented   Home Layout Able to live on 1st floor   Equipment Currently Used at Home walker, rolling   Readmission within 30 days? No   Patient currently being followed by outpatient case management? No   Do you currently have service(s) that help you manage your care at home? No   Do you take prescription medications? Yes   Do you have prescription coverage? Yes   Coverage BCBS   Do you have any problems affording any of your prescribed medications? No   Is the patient taking medications as prescribed? yes   Who is going to help you get home at discharge? family   How do you get to doctors appointments? car, drives self;family or friend will provide   Are you on dialysis? No   Do you take coumadin? No   Discharge Plan A Home   Discharge Plan B Home Health   DME Needed Upon Discharge  none   Discharge Plan discussed with: Patient   Transition of Care Barriers None   OTHER   Name(s) of People in Home SO     Completed assessment with pt at bedside, SO and dtr present. Introduced self and explained role as SW. Pt verb understanding to all questions asked. PCP is Maryjo Barton and pharmacy is [facility] in [place].     Mey Jean-Baptiste LCSW

## 2024-09-25 LAB
ANION GAP SERPL CALC-SCNC: 7 MEQ/L
BASOPHILS # BLD AUTO: 0.03 X10(3)/MCL
BASOPHILS NFR BLD AUTO: 0.4 %
BUN SERPL-MCNC: 8.7 MG/DL (ref 8.9–20.6)
CALCIUM SERPL-MCNC: 8.3 MG/DL (ref 8.4–10.2)
CHLORIDE SERPL-SCNC: 108 MMOL/L (ref 98–107)
CO2 SERPL-SCNC: 25 MMOL/L (ref 22–29)
CREAT SERPL-MCNC: 0.75 MG/DL (ref 0.73–1.18)
CREAT/UREA NIT SERPL: 12
EOSINOPHIL # BLD AUTO: 0.02 X10(3)/MCL (ref 0–0.9)
EOSINOPHIL NFR BLD AUTO: 0.3 %
ERYTHROCYTE [DISTWIDTH] IN BLOOD BY AUTOMATED COUNT: 11.5 % (ref 11.5–17)
GFR SERPLBLD CREATININE-BSD FMLA CKD-EPI: >60 ML/MIN/1.73/M2
GLUCOSE SERPL-MCNC: 125 MG/DL (ref 74–100)
HCT VFR BLD AUTO: 33.1 % (ref 42–52)
HGB BLD-MCNC: 11.5 G/DL (ref 14–18)
IMM GRANULOCYTES # BLD AUTO: 0.02 X10(3)/MCL (ref 0–0.04)
IMM GRANULOCYTES NFR BLD AUTO: 0.3 %
LYMPHOCYTES # BLD AUTO: 1.15 X10(3)/MCL (ref 0.6–4.6)
LYMPHOCYTES NFR BLD AUTO: 15.1 %
MCH RBC QN AUTO: 33 PG (ref 27–31)
MCHC RBC AUTO-ENTMCNC: 34.7 G/DL (ref 33–36)
MCV RBC AUTO: 95.1 FL (ref 80–94)
MONOCYTES # BLD AUTO: 0.72 X10(3)/MCL (ref 0.1–1.3)
MONOCYTES NFR BLD AUTO: 9.5 %
NEUTROPHILS # BLD AUTO: 5.67 X10(3)/MCL (ref 2.1–9.2)
NEUTROPHILS NFR BLD AUTO: 74.4 %
NRBC BLD AUTO-RTO: 0 %
PLATELET # BLD AUTO: 130 X10(3)/MCL (ref 130–400)
PMV BLD AUTO: 9.3 FL (ref 7.4–10.4)
POTASSIUM SERPL-SCNC: 3.8 MMOL/L (ref 3.5–5.1)
RBC # BLD AUTO: 3.48 X10(6)/MCL (ref 4.7–6.1)
SODIUM SERPL-SCNC: 140 MMOL/L (ref 136–145)
WBC # BLD AUTO: 7.61 X10(3)/MCL (ref 4.5–11.5)

## 2024-09-25 PROCEDURE — 80048 BASIC METABOLIC PNL TOTAL CA: CPT

## 2024-09-25 PROCEDURE — 25000003 PHARM REV CODE 250

## 2024-09-25 PROCEDURE — 99024 POSTOP FOLLOW-UP VISIT: CPT | Mod: ,,, | Performed by: NEUROLOGICAL SURGERY

## 2024-09-25 PROCEDURE — 85025 COMPLETE CBC W/AUTO DIFF WBC: CPT

## 2024-09-25 PROCEDURE — 11000001 HC ACUTE MED/SURG PRIVATE ROOM

## 2024-09-25 PROCEDURE — 63600175 PHARM REV CODE 636 W HCPCS

## 2024-09-25 PROCEDURE — 25000003 PHARM REV CODE 250: Performed by: NEUROLOGICAL SURGERY

## 2024-09-25 PROCEDURE — 36415 COLL VENOUS BLD VENIPUNCTURE: CPT

## 2024-09-25 RX ORDER — METHOCARBAMOL 500 MG/1
1000 TABLET, FILM COATED ORAL 3 TIMES DAILY PRN
Status: DISCONTINUED | OUTPATIENT
Start: 2024-09-25 | End: 2024-09-27 | Stop reason: HOSPADM

## 2024-09-25 RX ADMIN — OXYCODONE AND ACETAMINOPHEN 1 TABLET: 10; 325 TABLET ORAL at 10:09

## 2024-09-25 RX ADMIN — Medication 6 MG: at 08:09

## 2024-09-25 RX ADMIN — METHOCARBAMOL 1000 MG: 500 TABLET ORAL at 08:09

## 2024-09-25 RX ADMIN — MUPIROCIN: 20 OINTMENT TOPICAL at 09:09

## 2024-09-25 RX ADMIN — OXYCODONE AND ACETAMINOPHEN 1 TABLET: 10; 325 TABLET ORAL at 04:09

## 2024-09-25 RX ADMIN — CEFAZOLIN SODIUM 2 G: 2 SOLUTION INTRAVENOUS at 09:09

## 2024-09-25 RX ADMIN — DOCUSATE SODIUM 100 MG: 100 CAPSULE, LIQUID FILLED ORAL at 07:09

## 2024-09-25 RX ADMIN — MORPHINE SULFATE 1 MG: 4 INJECTION, SOLUTION INTRAMUSCULAR; INTRAVENOUS at 12:09

## 2024-09-25 RX ADMIN — MORPHINE SULFATE 1 MG: 4 INJECTION, SOLUTION INTRAMUSCULAR; INTRAVENOUS at 02:09

## 2024-09-25 RX ADMIN — MORPHINE SULFATE 1 MG: 4 INJECTION, SOLUTION INTRAMUSCULAR; INTRAVENOUS at 07:09

## 2024-09-25 RX ADMIN — METHOCARBAMOL 1000 MG: 500 TABLET ORAL at 12:09

## 2024-09-25 RX ADMIN — CEFAZOLIN SODIUM 2 G: 2 SOLUTION INTRAVENOUS at 06:09

## 2024-09-25 RX ADMIN — CEFAZOLIN SODIUM 2 G: 2 SOLUTION INTRAVENOUS at 04:09

## 2024-09-25 RX ADMIN — DOCUSATE SODIUM 100 MG: 100 CAPSULE, LIQUID FILLED ORAL at 09:09

## 2024-09-25 RX ADMIN — MUPIROCIN: 20 OINTMENT TOPICAL at 07:09

## 2024-09-25 RX ADMIN — METHOCARBAMOL 750 MG: 750 TABLET ORAL at 06:09

## 2024-09-25 NOTE — NURSING
Nurses Note -- 4 Eyes      9/25/2024   2:06 AM      Skin assessed during: Q Shift Change      [] No Altered Skin Integrity Present    []Prevention Measures Documented      [x] Yes- Altered Skin Integrity Present or Discovered   [] LDA Added if Not in Epic (Describe Wound)   [] New Altered Skin Integrity was Present on Admit and Documented in LDA   [] Wound Image Taken    Wound Care Consulted? No    Attending Nurse:  Aileen Ya RN/Staff Member:   Cathleen

## 2024-09-25 NOTE — CONSULTS
Ophthalmology Initial Consult Note    Patient Name: Matthew Romero  Age: 39 y.o.  : 1985  MRN: [id number]  Admission Date: 2024    Reason for Consult:      Medical Management  Floaters ou        Mirna Mehta MD    HPI:     Matthew Romero is a 39 y.o. male seen at bedside after  lumbar fusion for floaters ou. Pt states has been several years since seen by eye odtcor. No previous symptoms. Pt states he notices bilateral floaters after waking from anesthesia which have improved over the course of the day. Denies pain, flashes, decreased vision.        Current Facility-Administered Medications   Medication Dose Route Frequency Provider Last Rate Last Admin    0.9%  NaCl infusion   Intravenous Continuous Herpin, Shana,  mL/hr at 24 0510 Rate Verify at 24 0510    aluminum-magnesium hydroxide-simethicone 200-200-20 mg/5 mL suspension 30 mL  30 mL Oral Q4H PRN Herpin, Shana, FNP        cefazolin (ANCEF) 2 gram in dextrose 5% 50 mL IVPB (premix)  2 g Intravenous Q6H Herpin, Shana, FNP   Stopped at 24 1631    docusate sodium capsule 100 mg  100 mg Oral BID Herpin, Shana, FNP   100 mg at 24 0910    methocarbamoL tablet 750 mg  750 mg Oral TID PRN Herpin, Shana, FNP   750 mg at 24 1554    morphine injection 1 mg  1 mg Intravenous Q4H PRN Herpin, Shana, FNP   1 mg at 24 1301    mupirocin 2 % ointment   Nasal BID Herpin, Shana, FNP   Given at 24 0910    ondansetron disintegrating tablet 8 mg  8 mg Oral Q6H PRN Herpin, Shana, FNP   8 mg at 24 0102    oxyCODONE-acetaminophen  mg per tablet 1 tablet  1 tablet Oral Q6H PRN Mirna Mehta MD   1 tablet at 24 1634    phenylephrine HCL 10% ophthalmic solution 1 drop  1 drop Both Eyes Once Bernard, Maddi COUCH MD        prochlorperazine injection Soln 5 mg  5 mg Intravenous Q6H PRN Herpin, Shana, FNP   5 mg at 24 0123    proparacaine 0.5 % ophthalmic solution 1 drop  1 drop Both Eyes Once Bernard,  Maddi COUCH MD        senna-docusate 8.6-50 mg per tablet 2 tablet  2 tablet Oral Nightly PRN HerShana virk, FNP        sodium chloride 0.9% bolus 500 mL 500 mL  500 mL Intravenous Once Mirna Mehta MD        tropicamide 1% ophthalmic solution 1 drop  1 drop Both Eyes Once Bernard, MD Mick Rodriguez Ashley A., MD    Past Medical History:   Diagnosis Date    Anxiety     Arthritis     Cervicalgia     Chronic bilateral low back pain with bilateral sciatica     Lumbar disc disease with radiculopathy     Muscle spasm of back     Myalgia, unspecified site     Numbness and tingling of left leg     Pain     Radiculopathy, lumbosacral region     Sciatica, left side     Segmental and somatic dysfunction of cervical region     Segmental and somatic dysfunction of lumbar region     Segmental and somatic dysfunction of pelvic region     Segmental and somatic dysfunction of sacral region     Segmental and somatic dysfunction of thoracic region     Stiffness of left hip, not elsewhere classified     Tobacco dependence       Past Surgical History:   Procedure Laterality Date    BACK SURGERY  2007    microdiscectomy L4-5    HERNIA REPAIR      POSTERIOR LUMBAR INTERBODY FUSION (PLIF) WITH COMPUTER-ASSISTED NAVIGATION N/A 9/23/2024    Procedure: FUSION, SPINE, LUMBAR, PLIF, USING COMPUTER-ASSISTED NAVIGATION;  Surgeon: Mirna Mehta MD;  Location: [facility];  Service: Neurosurgery;  Laterality: N/A;  Redo lumbar surgery with L3-4, L4-5 laminectomy & posterolateral fusion // O-arm // NTI // TIVA // Medtronic // XX    WISDOM TOOTH EXTRACTION        Family History   Problem Relation Name Age of Onset    No Known Problems Mother      No Known Problems Father      Diabetes Maternal Grandmother      Kidney disease Maternal Grandfather      Kidney disease Paternal Grandfather       Social History     Tobacco Use    Smoking status: Former     Current packs/day: 1.50     Average packs/day: 1.5 packs/day for 15.0 years (22.5  ttl pk-yrs)     Types: Cigarettes    Smokeless tobacco: Never   Substance Use Topics    Alcohol use: Never     Medications Prior to Admission   Medication Sig Dispense Refill Last Dose    ibuprofen (ADVIL,MOTRIN) 200 MG tablet Take 400 mg by mouth every 6 (six) hours as needed for Pain.   Past Month     Review of patient's allergies indicates:  No Known Allergies         Review of Systems:     Negative except as above      Objective:       VITAL SIGNS: 24 HR MIN & MAX LAST    Temp  Min: 97.3 °F (36.3 °C)  Max: 98.4 °F (36.9 °C)  97.7 °F (36.5 °C)        BP  Min: 98/55  Max: 162/92  117/69     Pulse  Min: 65  Max: 103  72     Resp  Min: 11  Max: 18  17    SpO2  Min: 92 %  Max: 100 %  97 %        VISUAL ACUITY nsc:20/ 30 ou   PUPILS: 3-2mm OU, equal and reactive, no afferent pupillary defect  INTRAOCULAR PRESSURE: 14/15    CONFRONTATIONAL VISUAL FIELDS: grossly full OU  EXTRAOCULAR MOVEMENTS: full OU    ORBITS: no proptosis, no enopthalmos, no bony step-off OU  LIDS/LASHES/LACRIMAL: no lesions, no lacerations OU  CONJUNCTIVA/SCLERA: white and quiet OU  CORNEA: clear OU   ANTERIOR CHAMBER: deep and formed OU  IRIS: round and flat OU  LENS: clear OU    Undilated FUNDUS EXAM:   CDR: 0.3 OU, flat, pink, and healthy OU   MVP: no macular lesions OU, vessels normal OU, periphery without holes/tears/breaks OU      Assessment / Plan:       Active Hospital Problems    Diagnosis  POA    *Lumbar spondylosis [M47.816]  Yes      Resolved Hospital Problems   No resolved problems to display.         1) Floaters ou- Pt states the he does not want to be dilated due to not wanting to be blurry and feeling nauseated. Explained function of dilation for examination for full view of retina to rule out retinal tear. Pt would prefer to have dilation outpt. Pt to call to [facility] appt on d/c  Dr Maddi Brenard   [phone] [address] [address]

## 2024-09-25 NOTE — PROGRESS NOTES
Hospital Progress Note  [facility] Neurosurgery      Admit Date: 9/23/2024  Post-operative Day: 2 Days Post-Op  Hospital Day: 3        SUBJECTIVE:     POD #2 s/p redo lumbar surgery with L3-4, L4-5 laminectomy and posterolateral fusion    Interval History:  I evaluated Mr. Romero with his significant other at the bedside.  He is lying on his right side with his LSO brace in place. States he slept better with the compression of the brace. Had increased muscle spasms while ambulating to the bathroom overnight. Continues with progressive mobility efforts. Reports resolution of bilateral leg pain and that was present preoperatively.     Scheduled Meds:   ceFAZolin (Ancef) IV (PEDS and ADULTS)  2 g Intravenous Q6H    docusate sodium  100 mg Oral BID    mupirocin   Nasal BID    phenylephrine HCL 10%  1 drop Both Eyes Once    proparacaine  1 drop Both Eyes Once    sodium chloride 0.9%  500 mL Intravenous Once    tropicamide 1%  1 drop Both Eyes Once     Continuous Infusions:      PRN Meds:  Current Facility-Administered Medications:     aluminum-magnesium hydroxide-simethicone, 30 mL, Oral, Q4H PRN    melatonin, 6 mg, Oral, Nightly PRN    methocarbamoL, 750 mg, Oral, TID PRN    morphine, 1 mg, Intravenous, Q4H PRN    ondansetron, 8 mg, Oral, Q6H PRN    oxyCODONE-acetaminophen, 1 tablet, Oral, Q6H PRN    prochlorperazine, 5 mg, Intravenous, Q6H PRN    senna-docusate 8.6-50 mg, 2 tablet, Oral, Nightly PRN    Review of patient's allergies indicates:  No Known Allergies    Past Medical History:   Diagnosis Date    Anxiety     Arthritis     Cervicalgia     Chronic bilateral low back pain with bilateral sciatica     Lumbar disc disease with radiculopathy     Muscle spasm of back     Myalgia, unspecified site     Numbness and tingling of left leg     Pain     Radiculopathy, lumbosacral region     Sciatica, left side     Segmental and somatic dysfunction of cervical region     Segmental and somatic dysfunction of  lumbar region     Segmental and somatic dysfunction of pelvic region     Segmental and somatic dysfunction of sacral region     Segmental and somatic dysfunction of thoracic region     Stiffness of left hip, not elsewhere classified     Tobacco dependence      Past Surgical History:   Procedure Laterality Date    BACK SURGERY  2007    microdiscectomy L4-5    HERNIA REPAIR      POSTERIOR LUMBAR INTERBODY FUSION (PLIF) WITH COMPUTER-ASSISTED NAVIGATION N/A 9/23/2024    Procedure: FUSION, SPINE, LUMBAR, PLIF, USING COMPUTER-ASSISTED NAVIGATION;  Surgeon: Mirna Mehta MD;  Location: [facility];  Service: Neurosurgery;  Laterality: N/A;  Redo lumbar surgery with L3-4, L4-5 laminectomy & posterolateral fusion // O-arm // NTI // TIVA // Medtronic // XX    WISDOM TOOTH EXTRACTION         OBJECTIVE:     Vital Signs (Most Recent)  Temp: 98.3 °F (36.8 °C) (09/25/24 0616)  Pulse: 87 (09/25/24 0616)  Resp: 17 (09/25/24 0616)  BP: 114/73 (09/25/24 0616)  SpO2: 97 % (09/25/24 0616)    Vital Signs Range (Last 24H):  Temp:  [97.7 °F (36.5 °C)-98.8 °F (37.1 °C)]   Pulse:  [65-90]   Resp:  [17-18]   BP: ()/(54-73)   SpO2:  [93 %-98 %]     Drain: 385 cc since surgery, 110 cc 8 hrs serosanguinous      Physical Exam:  General Awake, alert, conversant, no acute distress     Motor Strength 5/5 x 4 extremities         Sensory Nl to light touch x 4 extremities     Wound Dressing- moderate drainage, dry, and intact         Laboratory Review:    CBC without Differential:  Lab Results   Component Value Date    WBC 7.61 09/25/2024    HGB 11.5 (L) 09/25/2024    HCT 33.1 (L) 09/25/2024     09/25/2024    MCV 95.1 (H) 09/25/2024     Differential:   Lab Results   Component Value Date    NEUTROABS 2.2 01/18/2024    LYMPHSABS 1.9 01/18/2024       Basic Metabolic Panel:  Lab Results   Component Value Date     09/25/2024    K 3.8 09/25/2024     (H) 09/25/2024    BUN 8.7 (L) 09/25/2024     Coagulation Studies:  Lab Results  "  Component Value Date    INR 1.0 09/13/2024     No results found for: "PTT"  Urinalysis:  Lab Results   Component Value Date    LEUKOCYTESUR Negative 09/13/2024    UROBILINOGEN Normal 09/13/2024        Radiology:    I have personally reviewed and evaluated the following reports as well as radiographic studies:        ASSESSMENT/PLAN:     ?is a 39 y.o.?male?who has a past medical history significant for anxiety. ?The patient is s/p a left L4-5 diskectomy on 03/01/2007 by . He is currently POD #2 s/p redo lumbar surgery with L3-4, L4-5 laminectomy and posterolateral fusion. He has a normal motor and sensory exam.     Plan:     Floor status  Neuro checks Q4h  Continue Hemovac drain. Continue ancef.   Continue to document drain output Q4 and Q24.  Mm pain control. Increased Robaxin for spasm.   Encouraged mobility. PT/OT signed off.   Patient to follow up with Ophthalmology outpatient  LSO brace any time HOB greater than 30 degrees or OOB  CM consult for discharge planning  SCS for DVT  Fall precautions    Arrangements have been made for Mr. Jimenez to follow up 2 weeks postoperatively with TERA Saldana in the neurosurgery clinic on 10/08/2024 at 10:30 am.    Neurosurgery will continue to follow the patient.  Please do not hesitate to contact neurosurgery for any additional questions or concerns.       JAQUELINE Rusos  Neurosurgery       "

## 2024-09-25 NOTE — NURSING
Nurses Note -- 4 Eyes      9/25/2024   8:22 AM      Skin assessed during: Q Shift Change      [] No Altered Skin Integrity Present    []Prevention Measures Documented      [x] Yes- Altered Skin Integrity Present or Discovered   [] LDA Added if Not in Epic (Describe Wound)   [] New Altered Skin Integrity was Present on Admit and Documented in LDA   [] Wound Image Taken    Wound Care Consulted? No    Attending Nurse:  AYLA Estrada    Second RN/Staff Member:   GREGORIA Gallagher

## 2024-09-26 LAB
ANION GAP SERPL CALC-SCNC: 6 MEQ/L
BASOPHILS # BLD AUTO: 0.04 X10(3)/MCL
BASOPHILS NFR BLD AUTO: 0.5 %
BUN SERPL-MCNC: 9.1 MG/DL (ref 8.9–20.6)
CALCIUM SERPL-MCNC: 9.2 MG/DL (ref 8.4–10.2)
CHLORIDE SERPL-SCNC: 104 MMOL/L (ref 98–107)
CO2 SERPL-SCNC: 29 MMOL/L (ref 22–29)
CREAT SERPL-MCNC: 0.8 MG/DL (ref 0.73–1.18)
CREAT/UREA NIT SERPL: 11
EOSINOPHIL # BLD AUTO: 0.03 X10(3)/MCL (ref 0–0.9)
EOSINOPHIL NFR BLD AUTO: 0.4 %
ERYTHROCYTE [DISTWIDTH] IN BLOOD BY AUTOMATED COUNT: 11.2 % (ref 11.5–17)
GFR SERPLBLD CREATININE-BSD FMLA CKD-EPI: >60 ML/MIN/1.73/M2
GLUCOSE SERPL-MCNC: 101 MG/DL (ref 74–100)
HCT VFR BLD AUTO: 33.7 % (ref 42–52)
HGB BLD-MCNC: 11.8 G/DL (ref 14–18)
IMM GRANULOCYTES # BLD AUTO: 0.02 X10(3)/MCL (ref 0–0.04)
IMM GRANULOCYTES NFR BLD AUTO: 0.2 %
LYMPHOCYTES # BLD AUTO: 2.31 X10(3)/MCL (ref 0.6–4.6)
LYMPHOCYTES NFR BLD AUTO: 27.2 %
MCH RBC QN AUTO: 32.4 PG (ref 27–31)
MCHC RBC AUTO-ENTMCNC: 35 G/DL (ref 33–36)
MCV RBC AUTO: 92.6 FL (ref 80–94)
MONOCYTES # BLD AUTO: 0.92 X10(3)/MCL (ref 0.1–1.3)
MONOCYTES NFR BLD AUTO: 10.8 %
NEUTROPHILS # BLD AUTO: 5.16 X10(3)/MCL (ref 2.1–9.2)
NEUTROPHILS NFR BLD AUTO: 60.9 %
NRBC BLD AUTO-RTO: 0 %
PLATELET # BLD AUTO: 157 X10(3)/MCL (ref 130–400)
PMV BLD AUTO: 8.9 FL (ref 7.4–10.4)
POTASSIUM SERPL-SCNC: 4.2 MMOL/L (ref 3.5–5.1)
RBC # BLD AUTO: 3.64 X10(6)/MCL (ref 4.7–6.1)
SODIUM SERPL-SCNC: 139 MMOL/L (ref 136–145)
WBC # BLD AUTO: 8.48 X10(3)/MCL (ref 4.5–11.5)

## 2024-09-26 PROCEDURE — 99024 POSTOP FOLLOW-UP VISIT: CPT | Mod: ,,, | Performed by: NEUROLOGICAL SURGERY

## 2024-09-26 PROCEDURE — 94799 UNLISTED PULMONARY SVC/PX: CPT

## 2024-09-26 PROCEDURE — 94761 N-INVAS EAR/PLS OXIMETRY MLT: CPT

## 2024-09-26 PROCEDURE — 63600175 PHARM REV CODE 636 W HCPCS

## 2024-09-26 PROCEDURE — 85025 COMPLETE CBC W/AUTO DIFF WBC: CPT

## 2024-09-26 PROCEDURE — 11000001 HC ACUTE MED/SURG PRIVATE ROOM

## 2024-09-26 PROCEDURE — 25000003 PHARM REV CODE 250

## 2024-09-26 PROCEDURE — 25000003 PHARM REV CODE 250: Performed by: NEUROLOGICAL SURGERY

## 2024-09-26 PROCEDURE — 80048 BASIC METABOLIC PNL TOTAL CA: CPT

## 2024-09-26 PROCEDURE — 36415 COLL VENOUS BLD VENIPUNCTURE: CPT

## 2024-09-26 RX ORDER — DIPHENHYDRAMINE HCL 25 MG
50 CAPSULE ORAL NIGHTLY PRN
Status: DISCONTINUED | OUTPATIENT
Start: 2024-09-26 | End: 2024-09-27 | Stop reason: HOSPADM

## 2024-09-26 RX ORDER — DIPHENHYDRAMINE HCL 25 MG
25 CAPSULE ORAL EVERY 6 HOURS PRN
Status: DISCONTINUED | OUTPATIENT
Start: 2024-09-26 | End: 2024-09-27 | Stop reason: HOSPADM

## 2024-09-26 RX ADMIN — DIPHENHYDRAMINE HYDROCHLORIDE 25 MG: 25 CAPSULE ORAL at 03:09

## 2024-09-26 RX ADMIN — METHOCARBAMOL 1000 MG: 500 TABLET ORAL at 04:09

## 2024-09-26 RX ADMIN — SENNOSIDES AND DOCUSATE SODIUM 2 TABLET: 50; 8.6 TABLET ORAL at 09:09

## 2024-09-26 RX ADMIN — OXYCODONE AND ACETAMINOPHEN 1 TABLET: 10; 325 TABLET ORAL at 06:09

## 2024-09-26 RX ADMIN — DOCUSATE SODIUM 100 MG: 100 CAPSULE, LIQUID FILLED ORAL at 09:09

## 2024-09-26 RX ADMIN — CEFAZOLIN SODIUM 2 G: 2 SOLUTION INTRAVENOUS at 12:09

## 2024-09-26 RX ADMIN — CEFAZOLIN SODIUM 2 G: 2 SOLUTION INTRAVENOUS at 06:09

## 2024-09-26 RX ADMIN — OXYCODONE AND ACETAMINOPHEN 1 TABLET: 10; 325 TABLET ORAL at 12:09

## 2024-09-26 RX ADMIN — METHOCARBAMOL 1000 MG: 500 TABLET ORAL at 02:09

## 2024-09-26 RX ADMIN — DIPHENHYDRAMINE HYDROCHLORIDE 25 MG: 25 CAPSULE ORAL at 09:09

## 2024-09-26 RX ADMIN — METHOCARBAMOL 1000 MG: 500 TABLET ORAL at 10:09

## 2024-09-26 RX ADMIN — Medication 6 MG: at 09:09

## 2024-09-26 NOTE — PLAN OF CARE
Problem: Adult Inpatient Plan of Care  Goal: Plan of Care Review  9/25/2024 2013 by Marisela Ricketts RN  Outcome: Progressing  9/25/2024 2013 by Marisela Ricketts RN  Outcome: Progressing  Goal: Patient-Specific Goal (Individualized)  9/25/2024 2013 by Marisela Ricketts RN  Outcome: Progressing  9/25/2024 2013 by Mariseal Ricketts RN  Outcome: Progressing  Goal: Absence of Hospital-Acquired Illness or Injury  9/25/2024 2013 by Marisela Ricketts RN  Outcome: Progressing  9/25/2024 2013 by Marisela Ricketts RN  Outcome: Progressing  Goal: Optimal Comfort and Wellbeing  9/25/2024 2013 by Marisela Ricketts RN  Outcome: Progressing  9/25/2024 2013 by Marisela Ricketts RN  Outcome: Progressing  Goal: Readiness for Transition of Care  9/25/2024 2013 by Marisela Ricketts RN  Outcome: Progressing  9/25/2024 2013 by Marisela Ricketts RN  Outcome: Progressing     Problem: Infection  Goal: Absence of Infection Signs and Symptoms  9/25/2024 2013 by Marisela Ricketts RN  Outcome: Progressing  9/25/2024 2013 by Marisela Ricketts RN  Outcome: Progressing     Problem: Wound  Goal: Optimal Coping  9/25/2024 2013 by Marisela Ricketts RN  Outcome: Progressing  9/25/2024 2013 by Marisela Ricketts RN  Outcome: Progressing  Goal: Optimal Functional Ability  9/25/2024 2013 by Marisela Ricketts RN  Outcome: Progressing  9/25/2024 2013 by Marisela Ricketts RN  Outcome: Progressing  Goal: Absence of Infection Signs and Symptoms  9/25/2024 2013 by Marisela Ricketts RN  Outcome: Progressing  9/25/2024 2013 by Marisela Ricketts RN  Outcome: Progressing  Goal: Improved Oral Intake  9/25/2024 2013 by Marisela Ricketts RN  Outcome: Progressing  9/25/2024 2013 by Marisela Ricketts RN  Outcome: Progressing  Goal: Optimal Pain Control and Function  9/25/2024 2013 by Marisela Ricketts RN  Outcome: Progressing  9/25/2024 2013 by Marisela Ricketts RN  Outcome: Progressing  Goal: Skin Health and Integrity  9/25/2024 2013 by Marisela Ricketts RN  Outcome:  Progressing  9/25/2024 2013 by Marisela Ricketts RN  Outcome: Progressing  Goal: Optimal Wound Healing  9/25/2024 2013 by Marisela Ricketts RN  Outcome: Progressing  9/25/2024 2013 by Marisela Ricketts RN  Outcome: Progressing     Problem: Skin Injury Risk Increased  Goal: Skin Health and Integrity  9/25/2024 2013 by Marisela Ricketts RN  Outcome: Progressing  9/25/2024 2013 by Marisela Ricketts RN  Outcome: Progressing     Problem: Pain Acute  Goal: Optimal Pain Control and Function  Outcome: Progressing

## 2024-09-26 NOTE — PROGRESS NOTES
Hospital Progress Note  [facility] Neurosurgery      Admit Date: 9/23/2024  Post-operative Day: 3 Days Post-Op  Hospital Day: 4        SUBJECTIVE:     POD #3 s/p redo lumbar surgery with L3-4, L4-5 laminectomy and posterolateral fusion    Interval History:  I evaluated Mr. Romero while he was walking the halls with his mother. His LSO brace in place. Continues with progressive mobility efforts. Complains of not being able to sleep through the night and itching. Reports resolution of bilateral leg pain and that was present preoperatively.     Scheduled Meds:   ceFAZolin (Ancef) IV (PEDS and ADULTS)  2 g Intravenous Q6H    docusate sodium  100 mg Oral BID    phenylephrine HCL 10%  1 drop Both Eyes Once    proparacaine  1 drop Both Eyes Once    sodium chloride 0.9%  500 mL Intravenous Once    tropicamide 1%  1 drop Both Eyes Once     Continuous Infusions:      PRN Meds:  Current Facility-Administered Medications:     aluminum-magnesium hydroxide-simethicone, 30 mL, Oral, Q4H PRN    melatonin, 6 mg, Oral, Nightly PRN    methocarbamoL, 1,000 mg, Oral, TID PRN    ondansetron, 8 mg, Oral, Q6H PRN    oxyCODONE-acetaminophen, 1 tablet, Oral, Q6H PRN    prochlorperazine, 5 mg, Intravenous, Q6H PRN    senna-docusate 8.6-50 mg, 2 tablet, Oral, Nightly PRN    Review of patient's allergies indicates:  No Known Allergies    Past Medical History:   Diagnosis Date    Anxiety     Arthritis     Cervicalgia     Chronic bilateral low back pain with bilateral sciatica     Lumbar disc disease with radiculopathy     Muscle spasm of back     Myalgia, unspecified site     Numbness and tingling of left leg     Pain     Radiculopathy, lumbosacral region     Sciatica, left side     Segmental and somatic dysfunction of cervical region     Segmental and somatic dysfunction of lumbar region     Segmental and somatic dysfunction of pelvic region     Segmental and somatic dysfunction of sacral region     Segmental and somatic  "dysfunction of thoracic region     Stiffness of left hip, not elsewhere classified     Tobacco dependence      Past Surgical History:   Procedure Laterality Date    BACK SURGERY  2007    microdiscectomy L4-5    HERNIA REPAIR      POSTERIOR LUMBAR INTERBODY FUSION (PLIF) WITH COMPUTER-ASSISTED NAVIGATION N/A 9/23/2024    Procedure: FUSION, SPINE, LUMBAR, PLIF, USING COMPUTER-ASSISTED NAVIGATION;  Surgeon: Mirna Mehta MD;  Location: [facility];  Service: Neurosurgery;  Laterality: N/A;  Redo lumbar surgery with L3-4, L4-5 laminectomy & posterolateral fusion // O-arm // NTI // TIVA // Medtronic // XX    WISDOM TOOTH EXTRACTION         OBJECTIVE:     Vital Signs (Most Recent)  Temp: 98.6 °F (37 °C) (09/26/24 0447)  Pulse: 93 (09/26/24 0447)  Resp: 18 (09/26/24 0640)  BP: 116/73 (09/26/24 0447)  SpO2: 98 % (09/26/24 0447)    Vital Signs Range (Last 24H):  Temp:  [98.6 °F (37 °C)-99.6 °F (37.6 °C)]   Pulse:  []   Resp:  [18]   BP: (101-126)/(64-75)   SpO2:  [98 %-99 %]     Drain: 197 cc since surgery, 110 cc 8 hrs serosanguinous      Physical Exam:  General Awake, alert, conversant, no acute distress     Motor Strength 5/5 x 4 extremities         Sensory Nl to light touch x 4 extremities     Wound Dressing- moderate drainage, dry, and intact         Laboratory Review:    CBC without Differential:  Lab Results   Component Value Date    WBC 8.48 09/26/2024    HGB 11.8 (L) 09/26/2024    HCT 33.7 (L) 09/26/2024     09/26/2024    MCV 92.6 09/26/2024     Differential:   Lab Results   Component Value Date    NEUTROABS 2.2 01/18/2024    LYMPHSABS 1.9 01/18/2024       Basic Metabolic Panel:  Lab Results   Component Value Date     09/26/2024    K 4.2 09/26/2024     09/26/2024    BUN 9.1 09/26/2024     Coagulation Studies:  Lab Results   Component Value Date    INR 1.0 09/13/2024     No results found for: "PTT"  Urinalysis:  Lab Results   Component Value Date    LEUKOCYTESUR Negative 09/13/2024    " UROBILINOGEN Normal 09/13/2024        Radiology:    I have personally reviewed and evaluated the following reports as well as radiographic studies:        ASSESSMENT/PLAN:     ?is a 39 y.o.?male?who has a past medical history significant for anxiety. ?The patient is s/p a left L4-5 diskectomy on 03/01/2007 by . He is currently POD #3 s/p redo lumbar surgery with L3-4, L4-5 laminectomy and posterolateral fusion. He has a normal motor and sensory exam.     Plan:     Floor status  Neuro checks Q4h  Continue Hemovac drain. Continue ancef.   Continue to document drain output Q4 and Q24.  Mm pain control. Increased Robaxin for spasm.   Encouraged mobility. PT/OT signed off.   Patient to follow up with Ophthalmology outpatient  LSO brace any time HOB greater than 30 degrees or OOB  CM consult for discharge planning  SCS for DVT  Fall precautions    Arrangements have been made for Mr. Jimenez to follow up 2 weeks postoperatively with TERA Saldana in the neurosurgery clinic on 10/08/2024 at 10:30 am.    Neurosurgery will continue to follow the patient.  Please do not hesitate to contact neurosurgery for any additional questions or concerns.       JAQUELINE Russo  Neurosurgery

## 2024-09-26 NOTE — NURSING
Nurses Note -- 4 Eyes      9/26/2024   8:10 AM      Skin assessed during: Q Shift Change      [] No Altered Skin Integrity Present    []Prevention Measures Documented      [x] Yes- Altered Skin Integrity Present or Discovered   [] LDA Added if Not in Epic (Describe Wound)   [x] New Altered Skin Integrity was Present on Admit and Documented in LDA   [] Wound Image Taken    Wound Care Consulted? Yes    Attending Nurse:  AYLA Bingham    Second RN/Staff Member:   GREGORIA Antonio

## 2024-09-27 VITALS
RESPIRATION RATE: 18 BRPM | HEIGHT: 72 IN | SYSTOLIC BLOOD PRESSURE: 144 MMHG | WEIGHT: 190.94 LBS | TEMPERATURE: 98 F | OXYGEN SATURATION: 100 % | HEART RATE: 80 BPM | DIASTOLIC BLOOD PRESSURE: 77 MMHG | BODY MASS INDEX: 25.86 KG/M2

## 2024-09-27 PROCEDURE — 25000003 PHARM REV CODE 250: Performed by: NEUROLOGICAL SURGERY

## 2024-09-27 PROCEDURE — 94760 N-INVAS EAR/PLS OXIMETRY 1: CPT

## 2024-09-27 PROCEDURE — 99900035 HC TECH TIME PER 15 MIN (STAT)

## 2024-09-27 PROCEDURE — 63600175 PHARM REV CODE 636 W HCPCS

## 2024-09-27 PROCEDURE — 25000003 PHARM REV CODE 250

## 2024-09-27 PROCEDURE — 94799 UNLISTED PULMONARY SVC/PX: CPT

## 2024-09-27 PROCEDURE — 99900031 HC PATIENT EDUCATION (STAT)

## 2024-09-27 RX ORDER — OXYCODONE AND ACETAMINOPHEN 10; 325 MG/1; MG/1
1 TABLET ORAL EVERY 6 HOURS PRN
Qty: 28 TABLET | Refills: 0 | Status: SHIPPED | OUTPATIENT
Start: 2024-09-27

## 2024-09-27 RX ORDER — METHOCARBAMOL 500 MG/1
1000 TABLET, FILM COATED ORAL 4 TIMES DAILY PRN
Qty: 80 TABLET | Refills: 0 | Status: SHIPPED | OUTPATIENT
Start: 2024-09-27 | End: 2024-10-07

## 2024-09-27 RX ORDER — METHOCARBAMOL 1000 MG/1
1000 TABLET, FILM COATED ORAL 3 TIMES DAILY PRN
Qty: 30 TABLET | Refills: 1 | Status: SHIPPED | OUTPATIENT
Start: 2024-09-27 | End: 2024-09-27

## 2024-09-27 RX ADMIN — CEFAZOLIN SODIUM 2 G: 2 SOLUTION INTRAVENOUS at 12:09

## 2024-09-27 RX ADMIN — CEFAZOLIN SODIUM 2 G: 2 SOLUTION INTRAVENOUS at 06:09

## 2024-09-27 RX ADMIN — OXYCODONE AND ACETAMINOPHEN 1 TABLET: 10; 325 TABLET ORAL at 12:09

## 2024-09-27 RX ADMIN — METHOCARBAMOL 1000 MG: 500 TABLET ORAL at 06:09

## 2024-09-27 RX ADMIN — DOCUSATE SODIUM 100 MG: 100 CAPSULE, LIQUID FILLED ORAL at 10:09

## 2024-09-27 RX ADMIN — OXYCODONE AND ACETAMINOPHEN 1 TABLET: 10; 325 TABLET ORAL at 10:09

## 2024-09-27 NOTE — PLAN OF CARE
09/27/24 0928   Final Note   Assessment Type Final Discharge Note   Anticipated Discharge Disposition Home   Post-Acute Status   Discharge Delays None known at this time     Pt to d/c home with family. No CM needs known at this time.    Mey Jean-Baptiste LCSW

## 2024-09-27 NOTE — NURSING
Nurses Note -- 4 Eyes      9/27/2024   8:45 AM      Skin assessed during: Q Shift Change      [x] No Altered Skin Integrity Present    []Prevention Measures Documented      [] Yes- Altered Skin Integrity Present or Discovered   [] LDA Added if Not in Epic (Describe Wound)   [] New Altered Skin Integrity was Present on Admit and Documented in LDA   [] Wound Image Taken    Wound Care Consulted? No    Attending Nurse:  AYLA Bingham    Second RN/Staff Member:   RGEGORIA Antonio

## 2024-09-27 NOTE — PLAN OF CARE
Problem: Adult Inpatient Plan of Care  Goal: Plan of Care Review  Outcome: Progressing  Goal: Patient-Specific Goal (Individualized)  Outcome: Progressing  Goal: Absence of Hospital-Acquired Illness or Injury  Outcome: Progressing  Goal: Optimal Comfort and Wellbeing  Outcome: Progressing  Goal: Readiness for Transition of Care  Outcome: Progressing     Problem: Infection  Goal: Absence of Infection Signs and Symptoms  Outcome: Progressing     Problem: Wound  Goal: Optimal Coping  Outcome: Progressing  Goal: Optimal Functional Ability  Outcome: Progressing  Goal: Absence of Infection Signs and Symptoms  Outcome: Progressing  Goal: Improved Oral Intake  Outcome: Progressing  Goal: Optimal Pain Control and Function  Outcome: Progressing  Goal: Skin Health and Integrity  Outcome: Progressing  Goal: Optimal Wound Healing  Outcome: Progressing     Problem: Skin Injury Risk Increased  Goal: Skin Health and Integrity  Outcome: Progressing     Problem: Pain Acute  Goal: Optimal Pain Control and Function  Outcome: Progressing

## 2024-09-27 NOTE — NURSING
Educated patient on discharge instructions and where to put up medications. Patient and wife verbalized understanding and have no further questions. Patient is AAOx4 and ready to go home.

## 2024-09-27 NOTE — NURSING
Nurses Note -- 4 Eyes      09/26/2024   1900      Skin assessed during: Q Shift Change      [x] No Altered Skin Integrity Present    []Prevention Measures Documented      [] Yes- Altered Skin Integrity Present or Discovered   [] LDA Added if Not in Epic (Describe Wound)   [] New Altered Skin Integrity was Present on Admit and Documented in LDA   [] Wound Image Taken    Wound Care Consulted? No    Attending Nurse:  Marisela Ya RN/Staff Member:   Otilia KINNEY  Nothing new.

## 2024-09-27 NOTE — DISCHARGE SUMMARY
[facility]  Discharge Summary  Neurosurgery    Admit Date: 9/23/2024    Discharge Date and Time:  09/27/2024 11:19 AM    Attending Physician: Mirna Mehta MD    Discharge Physician: Mirna Mehta MD    Reason for Admission:   ?is a 39 y.o.?male?who has a past medical history significant for anxiety. ?The patient is s/p a left L4-5 diskectomy on 03/01/2007 by . ?He presented as a?follow up patient in the neurosurgery clinic for?progressively worsening low back pain radiating into his left leg for the last 1 1/2 years.  Mr. Romero had a normal motor and sensory neurological exam.     Dr. Mehta reviewed pertinent imaging studies with the patient and his significant other.  A MRI lumbar spine without gadolinium completed on 02/01/2024 was reviewed.  When compared to a MRI lumbar spine without gadolinium that was completed on 03/14/2023, there were no significant changes. There was normal alignment with postoperative changes s/p a left L4-5 diskectomy.  L4-5 had degenerative disc disease with Modic changes. At L3-4, there was a disc bulge with an annular tear associated with mild stenosis and bilateral neuroforaminal narrowing.  Left L4-5 disc herniation with bilateral neuroforaminal narrowing.  At L5-S1, there was right-greater-than-left neuroforaminal narrowing.      It was discussed with Mr. Romero and his significant other that his symptoms of low back pain and radiating left leg pain were related to the degenerative changes in his lumbar spine. Because his symptoms had not significantly improved with optimization of medical management, he was considered a candidate surgery, specifically a redo lumbar surgery involving a L3-4, L4-5 laminectomy and posterolateral fusion.  I reviewed the risks, benefits, and alternatives of this surgery.  I specifically discussed with Mr. Romero that there were associated risks of adjacent level degeneration next to these fusion levels, which  needed to be considered at his young age.  He agreed to proceed.  All questions were answered to his satisfaction. Mr. Romero signed a surgical consent form.    Procedures Performed:   Redo lumbar surgery with L3-4, L4-5 laminectomy and posterolateral fusion with Dr. Mehta on 09/23/2024    Hospital Course: On 09/23/2024, Mr. Romero underwent a redo lumbar surgery with L3-4, L4-5 laminectomy and posterolateral fusion and tolerated the procedure well.  After surgery, the patient was extubated and transported to PACU in stable condition.  He was then transferred to the neurosurgical floor.      Postoperatively, Mr. Romero complained of moderate lower back pain with resolution of radiating leg pain that was present prior to surgical intervention.     The patient mobilized with an LSO brace alongside physical therapy as well as occupational therapy.  His Hemovac drain was removed on POD #3 on 09/27/2024.  He was discharged to home on 09/27/2024.  At the time of discharge, the patient was neurologically stable, was afebrile, and vital signs were stable.  He was tolerating a regular diet and voiding without difficulty.      Discharge instructions were verbally discussed with the patient, including wound care and follow up appointments.  The patient was also given a discharge instruction sheet explaining the aforementioned discussion.  The patient verbalized understanding of instructions and agreed to the plan.      Consults: None    Final Diagnoses:   1) Previous left L4-5 disectomy  2)  L3-4, L4-5 spondylosis   3)  Radiculopathy in left lower extremity    Discharged Condition: good   Principal Problem: Lumbar spondylosis   Secondary Diagnoses: Anxiety  Active Hospital Problems    Diagnosis  POA    *Lumbar spondylosis [M47.816]  Yes      Resolved Hospital Problems   No resolved problems to display.       Disposition: Home or Self Care    Diet: Regular as tolerated     Activity: Refer to discharge instructions    Follow  Up/Patient Instructions:   Arrangements have been made for the patient to follow up 2 weeks postoperatively with TERA Saldana in the neurosurgery clinic on 10/08/2024 at 10:30 am.     See patient instruction sheet for further details.    Medications:  Reconciled Home Medications:      Medication List        START taking these medications      methocarbamoL 1,000 mg Tab  Take 1,000 mg by mouth 3 (three) times daily as needed (Spasm).     oxyCODONE-acetaminophen  mg per tablet  Commonly known as: PERCOCET  Take 1 tablet by mouth every 6 (six) hours as needed for Pain.            STOP taking these medications      ibuprofen 200 MG tablet  Commonly known as: ADVIL,MOTRIN            No discharge procedures on file.   Follow-up Information       Shana Vega FNP Follow up on 10/8/2024.    Specialty: Neurosurgery  Why: Wound check, suture removal  @ 10:30  Contact information:  [address] [place] [address] Dr Maharaj [address] [place] [address]  [phone]               Maryjo Barton MD. Schedule an appointment as soon as possible for a visit in 1 week(s).    Specialty: Family Medicine  Why: for hospital follow up  Contact information:  [facility]  [facility] [address]  [phone]                           POSTERIOR THORACO-LUMBAR/ LUMBAR FUSION  DISCHARGE INSTRUCTIONS      1.  Wear your TLSO Brace when sitting upright and when you are out of bed.    Your brace will likely be discontinued after 3 months.      2.   Keep your incision clean and dry.    Your sutures or staples will be removed at your first postoperative follow-up appointment in approximately 14 days.   Place clean gauze and tape over your wound daily until your sutures or staples are removed.  Wait at least 72 hours from the time of your surgery to take a shower.  After showering, pat your incision dry and replace the wound dressing.  Do not immerse your incision in water for 4-6 weeks (e.g. bath tub, hot tub, swimming pool).      3.   Activity restrictions:  No lifting greater than 15 pounds for 3 months.  No bending, stooping, or twisting.  Avoid sitting in one position for over 30 minutes at a time.  No impact exercise or contact sports for at least 3 months.  No driving for at least 2 weeks.  To resume driving short distances (<30 minutes), you must be off of your narcotic medications and be able to comfortably brake suddenly, should the need arise.    Get up and walk.      4.  Contact your Neurosurgeon if the following occurs:  Signs and symptoms of infection, including fever above 101.5 degrees Fahrenheit and/or chills.  Redness, swelling, warmth, or drainage from the incision.  Any lasting changes in sensation, numbness, and/or tingling.  Increased weakness or increased pain.  Swelling of the foot and/or lower leg with calf pain.    Neurosurgery has office hours Monday through Friday, 8:00 AM to 4:00 PM except for holidays. There is an answering service available during non-office hours, with 24 hours neurosurgery coverage.  Report to the Emergency Department if you need immediate medical assistance.      Because you have had a fusion, you are not to take steroidal medications or non-steroidal anti-inflammatory (NSAID) medications such as Motrin/ Ibuprofen or Naprosyn/ Naproxen unless agreed upon with your neurosurgeon.      Please contact Dr. Mehta's office for any questions or concerns.  Typically, your first follow-up appointment after a posterior thoraco-lumbar/ lumbar fusion surgery is approximately 14 days from the date of your operation.  At this time, sutures or staples will be removed.  For your second postoperative appointment in 4-6 weeks, an x-ray of your lumbar spine will be arranged in Radiology before your neurosurgery appointment.

## 2024-09-30 ENCOUNTER — PATIENT MESSAGE (OUTPATIENT)
Dept: ADMINISTRATIVE | Facility: CLINIC | Age: 39
End: 2024-09-30
Payer: COMMERCIAL

## 2024-09-30 ENCOUNTER — PATIENT MESSAGE (OUTPATIENT)
Dept: ADMINISTRATIVE | Facility: OTHER | Age: 39
End: 2024-09-30
Payer: COMMERCIAL

## 2024-09-30 ENCOUNTER — TELEPHONE (OUTPATIENT)
Dept: NEUROSURGERY | Facility: CLINIC | Age: 39
End: 2024-09-30
Payer: COMMERCIAL

## 2024-09-30 DIAGNOSIS — G89.18 ACUTE POST-OPERATIVE PAIN: ICD-10-CM

## 2024-09-30 DIAGNOSIS — M51.16 LUMBAR DISC DISEASE WITH RADICULOPATHY: Primary | ICD-10-CM

## 2024-09-30 RX ORDER — CYCLOBENZAPRINE HCL 10 MG
10 TABLET ORAL 3 TIMES DAILY PRN
Qty: 30 TABLET | Refills: 2 | Status: SHIPPED | OUTPATIENT
Start: 2024-09-30 | End: 2024-10-30

## 2024-09-30 NOTE — TELEPHONE ENCOUNTER
Patient is s/p L3-4, L4-5 laminectomy & posterior lateral fusion on 9/23/24 by Dr. Mehta.  He was discharged from the hospital on Friday, 9/27.  He called this morning to report low grade fever and chills.  He reports after arriving home on Friday afternoon he took a dose of methocarbamol and shortly after, his eyes became really dry and started to hurt.  He then began to have chills and low grade temp.  This lasted a few hours, then he was okay.  He was okay Saturday until the evening.  He again had chills and low grade temp.  He says he woke twice during the night on Saturday drenched in sweat.  He reports feeling okay Sunday, until around 1pm, again had low grade temp and chills.  He reports temps between 99 and 101 degrees.  Temp this morning just before we spoke was 99.9.  He denies any redness or drainage from his incision.  He reports his pain is well controlled.  He has not required any pain medication today.  He continues to take Robaxin.  He says he is not really having any spasms currently, but was told to continue the muscle relaxers because he was having significant spasms while in the hospital.  He reports his sinuses are a little congested and his eyes hurt.  He denies any other symptoms.  I told him I would send a message to Dr. Mehta in surgery and call him back with recommendations.  I was able to talk to Dr. Mehta in the office.  She recommended he continue to monitor the fever and call with any changes.  It does not sound like an infection, as he is not experiencing any increased back pain or incision problems.  He can take Tylenol as needed for the fever.  Okay to send in Flexeril 10mg if he would like a different muscle relaxer.  I spoke with the patient regarding recommendations.  He did want to try a different muscle relaxer.  I sent the Flexeril to [facility] in [place].  He will call with any changes or increase in symptoms.

## 2024-10-01 NOTE — PATIENT INSTRUCTIONS
-Continue with daily incision care.   -Wash with gentle soap and water daily.   -Ensure incision is dry prior to dressing.   -If steri-strips were place, allow them to remain in place for 7-10 days. These should fall off on their own.   -No longer needed to keep it covered with a dressing.   -Leaving incision open to air is appropriate.   -Do not apply any ointments or creams directly to incision as this may cause increased skin break down.  -Continue to monitor for signs/symptoms infection such as: redness, warmth, drainage, body aches, fevers or chills.   -Do not submerge incision in bathtub, pool or any other body of water.     -Activity restrictions remain. Please refer to instructions that were previously sent home with you from the hospital.   -Continue to wear brace for a total of 3 months postoperatively.     Contact the clinic with any other questions or concerns you may have.

## 2024-10-01 NOTE — PROGRESS NOTES
[facility]  History & Physical  Neurosurgery      Matthew Romero   [id number]   1985       SUBJECTIVE:     CHIEF COMPLAINT:  2 week postoperative follow up, suture removal    HPI:    ?is a 39 y.o.?male?who has a past medical history significant for anxiety. ?The patient is s/p a left L4-5 diskectomy on 03/01/2007 by . ?He presented as a?follow up patient in the neurosurgery clinic for?progressively worsening low back pain radiating into his left leg for the last 1 1/2 years.  Mr. Romero had a normal motor and sensory neurological exam.     Dr. Mehta reviewed pertinent imaging studies with the patient and his significant other.  A MRI lumbar spine without gadolinium completed on 02/01/2024 was reviewed.  When compared to a MRI lumbar spine without gadolinium that was completed on 03/14/2023, there were no significant changes. There was normal alignment with postoperative changes s/p a left L4-5 diskectomy.  L4-5 had degenerative disc disease with Modic changes. At L3-4, there was a disc bulge with an annular tear associated with mild stenosis and bilateral neuroforaminal narrowing.  Left L4-5 disc herniation with bilateral neuroforaminal narrowing.  At L5-S1, there was right-greater-than-left neuroforaminal narrowing.      It was discussed with Mr. Romero and his significant other that his symptoms of low back pain and radiating left leg pain were related to the degenerative changes in his lumbar spine. Because his symptoms had not significantly improved with optimization of medical management, he was considered a candidate surgery, specifically a redo lumbar surgery involving a L3-4, L4-5 laminectomy and posterolateral fusion.  I reviewed the risks, benefits, and alternatives of this surgery.  I specifically discussed with Mr. Romero that there were associated risks of adjacent level degeneration next to these fusion levels, which needed to be considered at his young age.  He  agreed to proceed.  All questions were answered to his satisfaction. Mr. Romero signed a surgical consent form.    He presents today as a follow-up patient, accompanied by his signigicant other in the neurosurgery clinic for postoperative follow up and suture removal. Mr. Romero is status post redo lumbar surgery with L3-4, L4-5 laminectomy and posterolateral fusion on 09/23/2024 with Dr. Mehta. Patient had an uneventful recovery, working well with Physical and Occupational therapies while in the hospital.  His Hemovac drain was removed on postop day 3 and patient was discharged home.     He presents today with Aspen LSO brace in place. Reports compliance with this brace at all times except during showering. Complains mostly of irritation where the staples rub against the brace and where the brace touches his hips.  Pain rating in the lower back today is a 2/10.  He has not been taking any pain medication or muscle relaxants. The patient has had a difficult time regulating his body temperature and waking up with night sweats, blaming these on those medications.  His pain is better with repositioning.  He is endorsing right hip numbness radiating into his right groin. No pain or weakness to his right hip.  Denies disturbances in bowel or bladder function.  Ambulating independently, no difficulty with balance.      Patient Active Problem List    Diagnosis Date Noted    Lumbar spondylosis 09/23/2024    Chronic bilateral low back pain with bilateral sciatica 08/15/2023    Lumbar disc disease with radiculopathy 08/15/2023     Past Medical History:   Diagnosis Date    Anxiety     Arthritis     Cervicalgia     Chronic bilateral low back pain with bilateral sciatica     Lumbar disc disease with radiculopathy     Muscle spasm of back     Myalgia, unspecified site     Numbness and tingling of left leg     Pain     Radiculopathy, lumbosacral region     Sciatica, left side     Segmental and somatic dysfunction of cervical  region     Segmental and somatic dysfunction of lumbar region     Segmental and somatic dysfunction of pelvic region     Segmental and somatic dysfunction of sacral region     Segmental and somatic dysfunction of thoracic region     Stiffness of left hip, not elsewhere classified     Tobacco dependence      Past Surgical History:   Procedure Laterality Date    BACK SURGERY  2007    microdiscectomy L4-5    HERNIA REPAIR      POSTERIOR LUMBAR INTERBODY FUSION (PLIF) WITH COMPUTER-ASSISTED NAVIGATION N/A 9/23/2024    Procedure: FUSION, SPINE, LUMBAR, PLIF, USING COMPUTER-ASSISTED NAVIGATION;  Surgeon: Mirna Mehta MD;  Location: [facility];  Service: Neurosurgery;  Laterality: N/A;  Redo lumbar surgery with L3-4, L4-5 laminectomy & posterolateral fusion // O-arm // NTI // TIVA // [employer] // XX    WISDOM TOOTH EXTRACTION       No current outpatient medications on file.    Review of patient's allergies indicates:  No Known Allergies    Social History     Tobacco Use    Smoking status: Former     Current packs/day: 1.50     Average packs/day: 1.5 packs/day for 15.0 years (22.5 ttl pk-yrs)     Types: Cigarettes    Smokeless tobacco: Never   Substance Use Topics    Alcohol use: Never     Occupation:    Family History   Problem Relation Name Age of Onset    No Known Problems Mother      No Known Problems Father      Diabetes Maternal Grandmother      Kidney disease Maternal Grandfather      Kidney disease Paternal Grandfather         ROS:  Constitutional:  Negative for chills and fever.   HENT:  Negative for congestion and sore throat.    Eyes:  Negative for blurred vision and double vision.   Respiratory:  Negative for cough and shortness of breath.    Cardiovascular:  Negative for chest pain and palpitations.   Gastrointestinal:  Negative for constipation, diarrhea, nausea and vomiting.   Musculoskeletal:  Positive for back pain. Negative for neck pain.   Neurological:  Negative for sensory change, focal weakness and  headaches.   Endo/Heme/Allergies:  Does not bruise/bleed easily.   Psychiatric/Behavioral:  Negative for depression and anxiety.    ROS    OBJECTIVE:     Visit Vitals  /76 (BP Location: Left arm, Patient Position: Sitting)   Pulse 68   Temp 98.2 °F (36.8 °C)   Resp 16   Ht 6' (1.829 m)   Wt 87.8 kg (193 lb 9.6 oz)   BMI 26.26 kg/m²        Physical Exam    Body Habitus: Normal    Physical Exam:  Constitutional: The patient is well-developed and cooperative, sitting comfortably in a chair    Mental Status:   Oriented to person, place, and time  Normal speech    Motor:  Muscle bulk: normal in all extremities  Tone: normal in all extremities    Upper extremities:  Deltoid: right 5/5; left 5/5  Biceps: right 5/5; left 5/5  Triceps: right 5/5; left 5/5  Wrist extensors: right 5/5; left 5/5  Wrist flexors: right 5/5; left 5/5  : right 5/5; left 5/5  Interosseous muscles: right 5/5; left 5/5    Lower extremities:  Hip flexors: right 5/5; left 5/5  Knee extensors: right 5/5; left 5/5  Knee flexors: right 5/5; left 5/5  Foot dorsiflexors: right 5/5; left 5/5  Foot plantar flexors: right 5/5; left 5/5  Extensor hallucis longus: right 5/5; left 5/5    Sensation:  Normal to light touch x 4 extremities    Reflexes:  Biceps: right 2+/4; left 2+/4  Brachioradialis: right 2+/4; left 2+/4  Triceps: right 2+/4; left 2+/4  Knee: right 2+/4; left 2+/4  Ankle: right 2+/4; left 2+/4    Dass sign: right negative; left negative  Babinski: right downgoing; left downgoing  Clonus: right negative; left negative    Coordination:  Finger to nose: right normal; left normal    Musculoskeletal:    Posterior lumbar Incision is well approximated with mild erythema where the staples are present. No dehiscence, warmth or drainage present.     Gait: normal    Straight leg test: right negative; left negative    Cervical: No pain with palpation  Upper back: No pain with palpation  Lower back: Moderate pain with palpation    Imaging:  All  pertinent neuroimaging independently reviewed. Discussed these findings in detail with the patient.    XR Lumbar spine 10/08/2024 reveals postoperative changes with proper placement of posterior fusion hardware at L3-L5.  No indication of hardware failure or loosening at this time.     ASSESSMENT:       ICD-10-CM ICD-9-CM   1. Status post lumbar spinal fusion  Z98.1 V45.4   2. Lumbar disc disease with radiculopathy  M51.16 722.10     724.4     ?is a 39 y.o.?male?who has a past medical history significant for anxiety. ?The patient is s/p a left L4-5 diskectomy on 03/01/2007 by . ?He is status post redo lumbar surgery with L3-4, L4-5 laminectomy and posterolateral fusion on 09/23/2024 with Dr. Mehta. He reports full resolution of radiating pain into his bilateral lower extremities. Mr. Romero has a normal motor and sensory exam with the exception of decreased sensation to his right hip.     I reviewed pertinent imaging studies with the patient. XR Lumbar spine completed 10/08/2024 reveals postoperative changes with proper placement of posterior fusion hardware at L3-L5.  No indication of hardware failure or loosening at this time.         PLAN:       Encounter Diagnoses   Name Primary?    Status post lumbar spinal fusion Yes    Lumbar disc disease with radiculopathy        Orders Placed This Encounter   Procedures    X-Ray Lumbar Spine AP And Lateral    CBC Auto Differential        Matthew Romero presents today for a 2 week postoperative visit. He currently reports resolution of back pain radiating into bilateral lower extremities. Current pain rating is a 2/10. He has not taken any pain medications or muscle relaxants as he suspected they were causing him night sweats. Encouraged to continue taking those medications as needed and not to allow his body to stress out during the acute healing phase. With the complaints of not being able to regular his body temperature and waking up with night sweats,  it was suggested that he take OTC iron for one month. Educated patient on how to take iron and ordered CBC as patient and his significant other are concerned about levels. Posterior lumbar incision is well approximated. Patient tolerated staple removal well. Discussed  activity restrictions and incisional care. He is continue wearing his LSO brace for a total of 3 months postoperatively. Patient verbalized understanding. He will follow up in 4 weeks with imaging prior to visit for surveillance purposes. Instructed to contact the clinic if he needs to be seen sooner or symptoms worsen.     Follow up in about 4 weeks (around 11/5/2024) for With imaging prior to visit.      This note will be sent to the patient's referring provider No ref. provider found and primary care provider Maryjo Barton MD.          Shana Vega, LATOSHAP-C  Neurosurgery      Disclaimer:  This note is prepared using voice recognition software and as such is likely to have errors despite attempts at proofreading. Please contact me for questions.

## 2024-10-08 ENCOUNTER — HOSPITAL ENCOUNTER (OUTPATIENT)
Dept: RADIOLOGY | Facility: HOSPITAL | Age: 39
Discharge: HOME | End: 2024-10-08
Attending: NEUROLOGICAL SURGERY
Payer: COMMERCIAL

## 2024-10-08 ENCOUNTER — OFFICE VISIT (OUTPATIENT)
Dept: NEUROSURGERY | Facility: CLINIC | Age: 39
End: 2024-10-08
Payer: COMMERCIAL

## 2024-10-08 VITALS
DIASTOLIC BLOOD PRESSURE: 76 MMHG | SYSTOLIC BLOOD PRESSURE: 121 MMHG | HEART RATE: 68 BPM | HEIGHT: 72 IN | TEMPERATURE: 98 F | RESPIRATION RATE: 16 BRPM | BODY MASS INDEX: 26.23 KG/M2 | WEIGHT: 193.63 LBS

## 2024-10-08 DIAGNOSIS — M51.16 LUMBAR DISC DISEASE WITH RADICULOPATHY: ICD-10-CM

## 2024-10-08 DIAGNOSIS — Z98.1 STATUS POST LUMBAR SPINAL FUSION: Primary | ICD-10-CM

## 2024-10-08 PROCEDURE — 3074F SYST BP LT 130 MM HG: CPT | Mod: CPTII,,,

## 2024-10-08 PROCEDURE — 1159F MED LIST DOCD IN RCRD: CPT | Mod: CPTII,,,

## 2024-10-08 PROCEDURE — 3078F DIAST BP <80 MM HG: CPT | Mod: CPTII,,,

## 2024-10-08 PROCEDURE — 99024 POSTOP FOLLOW-UP VISIT: CPT | Mod: ,,,

## 2024-10-08 PROCEDURE — 1160F RVW MEDS BY RX/DR IN RCRD: CPT | Mod: CPTII,,,

## 2024-10-08 PROCEDURE — 72100 X-RAY EXAM L-S SPINE 2/3 VWS: CPT | Mod: TC

## 2024-10-25 ENCOUNTER — TELEPHONE (OUTPATIENT)
Dept: NEUROSURGERY | Facility: CLINIC | Age: 39
End: 2024-10-25
Payer: COMMERCIAL

## 2024-10-25 NOTE — TELEPHONE ENCOUNTER
The patient called today to report pain and swelling over his incision that began about three to four days ago.  He is s/p L3-4, L4-5 laminectomy & posterolateral fusion by Dr. Mehta on 9/23/24.  He was seen by Shana on 10/8 for postoperative follow up and staple removal.  He recalls having a little more pain and sensitivity at the incision when the staples were being removed than he has had with previous surgery.  Aside from this he has not had any problems.  He began using his bone growth stimulator about one week ago.  A few days later, he noticed that his brace was putting pressure on his incision and causing discomfort.  He reports swelling around the incision that he describes as hard.  He denies any redness, warmth, or drainage.  He has not been running any fever.  He reports no new or increased pain in the lower extremities or back, aside from the incisional area.  He says he read that the bone growth stimulator could cause increased pain at the incision.  I told him it was not a common complaint, but suggested he reach out to Sharita with [employer] for more information regarding this.  I asked if he was taking any muscle relaxers.  He said he is not taking anything currently, but did have muscle relaxers left over from surgery.  I explained that sometimes the muscles can appear swollen when they spasm and become tight.  He is going to try taking the muscle relaxers to see if this helps.  I suggested he continue to wear the brace when he is up and doing things, but it was okay to take it off when he is sitting or resting since it is causing so much discomfort.  He will try putting something between the brace and his incision to provide padding when wearing the brace.  He was not scheduled to come in until 11/5 for 6 week post op appt with x-rays.  I told him I would go ahead and move his appt up to next week, so that his incision could be assessed.  He is scheduled to see Shnaa this Tuesday, 10/29, at 10:30am  with x-rays at [facility] at 9:15am.  He will call with any changes or concerns in the meantime.

## 2024-10-29 ENCOUNTER — OFFICE VISIT (OUTPATIENT)
Dept: NEUROSURGERY | Facility: CLINIC | Age: 39
End: 2024-10-29
Payer: COMMERCIAL

## 2024-10-29 ENCOUNTER — HOSPITAL ENCOUNTER (OUTPATIENT)
Dept: RADIOLOGY | Facility: HOSPITAL | Age: 39
Discharge: HOME | End: 2024-10-29
Payer: COMMERCIAL

## 2024-10-29 VITALS
HEIGHT: 72 IN | RESPIRATION RATE: 18 BRPM | SYSTOLIC BLOOD PRESSURE: 122 MMHG | HEART RATE: 78 BPM | BODY MASS INDEX: 26.82 KG/M2 | DIASTOLIC BLOOD PRESSURE: 76 MMHG | WEIGHT: 198 LBS

## 2024-10-29 DIAGNOSIS — Z98.1 STATUS POST LUMBAR SPINAL FUSION: Primary | ICD-10-CM

## 2024-10-29 DIAGNOSIS — Z98.1 STATUS POST LUMBAR SPINAL FUSION: ICD-10-CM

## 2024-10-29 DIAGNOSIS — M51.16 LUMBAR DISC DISEASE WITH RADICULOPATHY: ICD-10-CM

## 2024-10-29 DIAGNOSIS — M47.816 LUMBAR SPONDYLOSIS: ICD-10-CM

## 2024-10-29 PROCEDURE — 3074F SYST BP LT 130 MM HG: CPT | Mod: CPTII,,,

## 2024-10-29 PROCEDURE — 1159F MED LIST DOCD IN RCRD: CPT | Mod: CPTII,,,

## 2024-10-29 PROCEDURE — 1160F RVW MEDS BY RX/DR IN RCRD: CPT | Mod: CPTII,,,

## 2024-10-29 PROCEDURE — 72100 X-RAY EXAM L-S SPINE 2/3 VWS: CPT | Mod: TC

## 2024-10-29 PROCEDURE — 99024 POSTOP FOLLOW-UP VISIT: CPT | Mod: ,,,

## 2024-10-29 PROCEDURE — 3078F DIAST BP <80 MM HG: CPT | Mod: CPTII,,,

## 2024-12-10 NOTE — PROGRESS NOTES
[facility]  History & Physical  Neurosurgery      Matthew Romero   [id number]   1985       SUBJECTIVE:     CHIEF COMPLAINT:  3 month postoperative follow up    HPI:    ?is a 39 y.o.?male?who has a past medical history significant for anxiety. ?The patient is s/p a left L4-5 diskectomy on 03/01/2007 by . He is also s/p L3-4, L4-5 laminectomy and posterolateral fusion on 09/23/2024 with Dr. Mehta. Mr. Romero has a normal motor and sensory neurological exam.    He presents today in the neurosurgery clinic for 3 month postoperative follow up. He is accompanied by his significant other. He presents with Aspen LSO brace in place. Reports compliance with this brace at all times except during showering.  Rates his pain a 0/10 today. Has not worn the bone growth stimulator since last visit. No longer taking narcotics or muscle relaxant. Complains of intermittent muscle tightness to mid to lower back. Continues to have a difficult time changing position while sleeping with tightness in his lower back. His pain is better with repositioning. Uses ice at the end of the day which does improve his pain. Occasionally taking tylenol. Has stayed away from NSAIDs. Denies disturbances in bowel or bladder function.  Ambulating independently, no difficulty with balance. Returned to work on light duty.         Patient Active Problem List    Diagnosis Date Noted    Lumbar spondylosis 09/23/2024    Chronic bilateral low back pain with bilateral sciatica 08/15/2023    Lumbar disc disease with radiculopathy 08/15/2023     Past Medical History:   Diagnosis Date    Anxiety     Arthritis     Cervicalgia     Chronic bilateral low back pain with bilateral sciatica     Lumbar disc disease with radiculopathy     Muscle spasm of back     Myalgia, unspecified site     Numbness and tingling of left leg     Pain     Radiculopathy, lumbosacral region     Sciatica, left side     Segmental and somatic dysfunction of  cervical region     Segmental and somatic dysfunction of lumbar region     Segmental and somatic dysfunction of pelvic region     Segmental and somatic dysfunction of sacral region     Segmental and somatic dysfunction of thoracic region     Stiffness of left hip, not elsewhere classified     Tobacco dependence      Past Surgical History:   Procedure Laterality Date    BACK SURGERY  2007    microdiscectomy L4-5    HERNIA REPAIR      POSTERIOR LUMBAR INTERBODY FUSION (PLIF) WITH COMPUTER-ASSISTED NAVIGATION N/A 9/23/2024    Procedure: FUSION, SPINE, LUMBAR, PLIF, USING COMPUTER-ASSISTED NAVIGATION;  Surgeon: Mirna Mehta MD;  Location: [facility];  Service: Neurosurgery;  Laterality: N/A;  Redo lumbar surgery with L3-4, L4-5 laminectomy & posterolateral fusion // O-arm // NTI // TIVA // [employer] // XX    WISDOM TOOTH EXTRACTION       No current outpatient medications on file.    Review of patient's allergies indicates:  No Known Allergies    Social History     Tobacco Use    Smoking status: Former     Current packs/day: 1.50     Average packs/day: 1.5 packs/day for 15.0 years (22.5 ttl pk-yrs)     Types: Cigarettes    Smokeless tobacco: Never    Tobacco comments:     Quit in August 2024   Substance Use Topics    Alcohol use: Never     Occupation: [employer]    Family History   Problem Relation Name Age of Onset    No Known Problems Mother      No Known Problems Father      Diabetes Maternal Grandmother      Kidney disease Maternal Grandfather      Kidney disease Paternal Grandfather         ROS:  Constitutional:  Negative for chills and fever.   HENT:  Negative for congestion and sore throat.    Eyes:  Negative for blurred vision and double vision.   Respiratory:  Negative for cough and shortness of breath.    Cardiovascular:  Negative for chest pain and palpitations.   Gastrointestinal:  Negative for constipation, diarrhea, nausea and vomiting.   Musculoskeletal:  Negative for back pain and neck pain.    Neurological:  Negative for sensory change, focal weakness and headaches.   Endo/Heme/Allergies:  Does not bruise/bleed easily.   Psychiatric/Behavioral:  Negative for depression and anxiety.    ROS    OBJECTIVE:     Visit Vitals  /84 (BP Location: Right arm, Patient Position: Sitting)   Pulse 67   Resp 16   Ht 6' (1.829 m)   Wt 95.2 kg (209 lb 12.8 oz)   BMI 28.45 kg/m²       Physical Exam    Body Habitus: Normal    Physical Exam:  Constitutional: The patient is well-developed and cooperative, sitting comfortably in a chair    Mental Status:   Oriented to person, place, and time  Normal speech    Motor:  Muscle bulk: normal in all extremities  Tone: normal in all extremities    Upper extremities:  Deltoid: right 5/5; left 5/5  Biceps: right 5/5; left 5/5  Triceps: right 5/5; left 5/5  Wrist extensors: right 5/5; left 5/5  Wrist flexors: right 5/5; left 5/5  : right 5/5; left 5/5  Interosseous muscles: right 5/5; left 5/5    Lower extremities:  Hip flexors: right 5/5; left 5/5  Knee extensors: right 5/5; left 5/5  Knee flexors: right 5/5; left 5/5  Foot dorsiflexors: right 5/5; left 5/5  Foot plantar flexors: right 5/5; left 5/5  Extensor hallucis longus: right 5/5; left 5/5    Sensation:  Normal to light touch x 4 extremities    Reflexes:  Biceps: right 2+/4; left 2+/4  Brachioradialis: right 2+/4; left 2+/4  Triceps: right 2+/4; left 2+/4  Knee: right 2+/4; left 2+/4  Ankle: right 2+/4; left 2+/4    Dass sign: right negative; left negative  Babinski: right downgoing; left downgoing  Clonus: right negative; left negative    Coordination:  Finger to nose: right normal; left normal    Musculoskeletal:    Posterior lumbar Incision is well healed.  Gait: normal    Straight leg test: right negative; left negative    Cervical: No pain with palpation  Upper back: No pain with palpation  Lower back: Mild pain with palpation at right distal end of incision    Imaging:  All pertinent neuroimaging  independently reviewed. Discussed these findings in detail with the patient.    XR Lumbar spine 12/16/2024 reveals stable postoperative changes with proper placement of posterior fusion hardware at L3-L5.  No indication of hardware failure or loosening at this time.     CT lumbar spine completed 12/16/2024 reveals decompressive laminectomies from L3 to L5. Posterior fusion hardware present spanning from L3 to L5 with no evidence of failure or loosening.     ASSESSMENT:       ICD-10-CM ICD-9-CM   1. Status post lumbar spinal fusion  Z98.1 V45.4   2. Lumbar spondylosis  M47.816 721.3       ?is a 39 y.o.?male?who has a past medical history significant for anxiety. ?The patient is s/p a left L4-5 diskectomy on 03/01/2007 by . ?He is status post redo lumbar surgery with L3-4, L4-5 laminectomy and posterolateral fusion on 09/23/2024 with Dr. Mehta. He reports full resolution of radiating pain into his bilateral lower extremities. Mr. Romero has a normal motor and sensory neurological exam.     I reviewed pertinent imaging studies with the patient. XR Lumbar spine 12/16/2024 reveals stable postoperative changes with proper placement of posterior fusion hardware at L3-L5.  No indication of hardware failure or loosening at this time.     CT lumbar spine completed 12/16/2024 reveals decompressive laminectomies from L3 to L5. Posterior fusion hardware present spanning from L3 to L5 with no evidence of failure or loosening.     PLAN:       Orders Placed This Encounter   Procedures    Ambulatory referral/consult to Physical/Occupational Therapy        Matthew Romero presents today for a 3 month postoperative visit. He currently reports resolution of back pain radiating into bilateral lower extremities. Rated pain a 0/10 today. Encouraged to continue OTC analgesics as needed for pain. May return to taking NSAIDs as tolerated. Posterior lumbar incision is well healed. Discussed referral to outpatient physical  therapy due to complaints of muscle tightness. Referral sent to [facility] where patient is established with [place]. Encouraged use of heat, massage therapy, and/or TENS unit. Continue use of ice as needed/tolerated. Begin weaning the LSO brace. Discussed this in detail. Able to return to normal activity without restrictions. Gradual return to strenuous activity. Patient verbalized understanding. Patient to follow up PRN.     Follow up if symptoms worsen or fail to improve.      This note will be sent to the patient's referring provider No ref. provider found and primary care provider Maryjo Barton MD.          Shana Vega, LATOSHAP-C  Neurosurgery      Disclaimer:  This note is prepared using voice recognition software and as such is likely to have errors despite attempts at proofreading. Please contact me for questions.

## 2024-12-11 NOTE — PATIENT INSTRUCTIONS
Continue to take over the counter analgesics as needed for discomfort. May resume NSAIDs as needed.  Begin working with physical therapy as tolerated to return to baseline activity  Continue working on progressive mobility   Begin weaning TLSO brace.  Return to baseline activity with gradual return to strenuous activity.     Contact the clinic with any other questions or concerns you may have.   Follow up with neurosurgery clinic as needed.

## 2024-12-16 ENCOUNTER — HOSPITAL ENCOUNTER (OUTPATIENT)
Dept: RADIOLOGY | Facility: HOSPITAL | Age: 39
Discharge: HOME | End: 2024-12-16
Payer: COMMERCIAL

## 2024-12-16 DIAGNOSIS — Z98.1 STATUS POST LUMBAR SPINAL FUSION: ICD-10-CM

## 2024-12-16 PROCEDURE — 72100 X-RAY EXAM L-S SPINE 2/3 VWS: CPT | Mod: TC

## 2024-12-16 PROCEDURE — 72131 CT LUMBAR SPINE W/O DYE: CPT | Mod: TC

## 2024-12-17 ENCOUNTER — OFFICE VISIT (OUTPATIENT)
Dept: NEUROSURGERY | Facility: CLINIC | Age: 39
End: 2024-12-17
Payer: COMMERCIAL

## 2024-12-17 VITALS
WEIGHT: 209.81 LBS | SYSTOLIC BLOOD PRESSURE: 124 MMHG | RESPIRATION RATE: 16 BRPM | HEIGHT: 72 IN | HEART RATE: 67 BPM | DIASTOLIC BLOOD PRESSURE: 84 MMHG | BODY MASS INDEX: 28.42 KG/M2

## 2024-12-17 DIAGNOSIS — M47.816 LUMBAR SPONDYLOSIS: ICD-10-CM

## 2024-12-17 DIAGNOSIS — Z98.1 STATUS POST LUMBAR SPINAL FUSION: Primary | ICD-10-CM

## 2024-12-17 PROCEDURE — 1160F RVW MEDS BY RX/DR IN RCRD: CPT | Mod: CPTII,,,

## 2024-12-17 PROCEDURE — 99024 POSTOP FOLLOW-UP VISIT: CPT | Mod: ,,,

## 2024-12-17 PROCEDURE — 3079F DIAST BP 80-89 MM HG: CPT | Mod: CPTII,,,

## 2024-12-17 PROCEDURE — 1159F MED LIST DOCD IN RCRD: CPT | Mod: CPTII,,,

## 2024-12-17 PROCEDURE — 3074F SYST BP LT 130 MM HG: CPT | Mod: CPTII,,,

## (undated) DEVICE — TUBING SILICON CLR 3/16IN 10FT

## (undated) DEVICE — BENZOIN TINCTURE 0.66ML

## (undated) DEVICE — SPONGE SURGIFOAM 100 8.5X12X10

## (undated) DEVICE — TRAY CATH FOL SIL URIMTR 16FR

## (undated) DEVICE — SEALER AQUAMANTYS 2.3 BIPOLAR

## (undated) DEVICE — TAPE CLOTH SOFT MEDIPORE 4IN

## (undated) DEVICE — SPONGE COTTON TRAY 4X4IN

## (undated) DEVICE — DISH PETRI MED 3.5IN

## (undated) DEVICE — PAD DERMAPROX XL 22X14X.5IN

## (undated) DEVICE — KIT DRAIN WOUND RND SPRNG RESV

## (undated) DEVICE — APPLICATOR CHLORAPREP ORN 26ML

## (undated) DEVICE — COVER HD BACK TABLE 6FT

## (undated) DEVICE — PREP CARTRIDGE

## (undated) DEVICE — NDL HYPO 22GX1 1/2 SYR 10ML LL

## (undated) DEVICE — DRAPE C-ARMOR EQUIPMENT COVER

## (undated) DEVICE — DRAPE ORTH SPLIT 77X108IN

## (undated) DEVICE — SPONGE GAUZE 16PLY 4X4

## (undated) DEVICE — KIT POS JACKSON TABLE NO HDRST

## (undated) DEVICE — MARKER WRITESITE SKIN CHLRAPRP

## (undated) DEVICE — PILLOW HEAD REST

## (undated) DEVICE — STAPLER SKIN PROXIMATE WIDE

## (undated) DEVICE — DRAPE C-ARM COVER EZ 36X28IN

## (undated) DEVICE — DRAPE SURG W/TWL 17 5/8X23

## (undated) DEVICE — ELECTRODE PATIENT RETURN DISP

## (undated) DEVICE — GLOVE PROTEXIS PI SYN SURG 6.5

## (undated) DEVICE — BLADE MILL+ BONE MEDIUM DISP

## (undated) DEVICE — PROBE BALL TIP 2.3MM

## (undated) DEVICE — COVER STERILE-Z BACK TABLE XL

## (undated) DEVICE — SUT VICRYL 2 0 CT 2

## (undated) DEVICE — SPONGE LAP 18X18 PREWASHED

## (undated) DEVICE — SUT VICRYL PLUS 0 CT-1 18IN

## (undated) DEVICE — GLOVE PROTEXIS PI SYN SURG 7

## (undated) DEVICE — SPHERE NDI PASSIVE

## (undated) DEVICE — Device

## (undated) DEVICE — KIT SURGIFLO HEMOSTATIC MATRIX

## (undated) DEVICE — GOWN X-LG STERILE BACK

## (undated) DEVICE — BLADE EZ CLEAN 2 1/2

## (undated) DEVICE — ELECTRODE BLADE E-Z CLEAN 4IN

## (undated) DEVICE — DRESSING AQUACEL AG ADV 3.5X12

## (undated) DEVICE — KIT SURGICAL TURNOVER

## (undated) DEVICE — BUR BONE CUT MICRO TPS 3X3.8MM

## (undated) DEVICE — SPONGE PATTY NEURO SGL 0.5X6

## (undated) DEVICE — SOL NACL IRR 1000ML BTL

## (undated) DEVICE — DRAPE TOP 53X102IN